# Patient Record
Sex: MALE | Race: WHITE | NOT HISPANIC OR LATINO | Employment: FULL TIME | URBAN - METROPOLITAN AREA
[De-identification: names, ages, dates, MRNs, and addresses within clinical notes are randomized per-mention and may not be internally consistent; named-entity substitution may affect disease eponyms.]

---

## 2019-05-13 ENCOUNTER — HOSPITAL ENCOUNTER (OUTPATIENT)
Dept: BEHAVIORAL HEALTH | Facility: CLINIC | Age: 24
Discharge: HOME | End: 2019-05-13
Attending: SOCIAL WORKER | Admitting: SOCIAL WORKER
Payer: COMMERCIAL

## 2019-05-13 VITALS — HEIGHT: 72 IN | WEIGHT: 205 LBS | BODY MASS INDEX: 27.77 KG/M2

## 2019-05-13 PROCEDURE — H0001 ALCOHOL AND/OR DRUG ASSESS: HCPCS

## 2019-05-13 ASSESSMENT — ANXIETY QUESTIONNAIRES
1. FEELING NERVOUS, ANXIOUS, OR ON EDGE: NOT AT ALL
5. BEING SO RESTLESS THAT IT IS HARD TO SIT STILL: NOT AT ALL
6. BECOMING EASILY ANNOYED OR IRRITABLE: NOT AT ALL
7. FEELING AFRAID AS IF SOMETHING AWFUL MIGHT HAPPEN: NOT AT ALL
IF YOU CHECKED OFF ANY PROBLEMS ON THIS QUESTIONNAIRE, HOW DIFFICULT HAVE THESE PROBLEMS MADE IT FOR YOU TO DO YOUR WORK, TAKE CARE OF THINGS AT HOME, OR GET ALONG WITH OTHER PEOPLE: NOT DIFFICULT AT ALL
GAD7 TOTAL SCORE: 0
3. WORRYING TOO MUCH ABOUT DIFFERENT THINGS: NOT AT ALL
2. NOT BEING ABLE TO STOP OR CONTROL WORRYING: NOT AT ALL

## 2019-05-13 ASSESSMENT — PATIENT HEALTH QUESTIONNAIRE - PHQ9
SUM OF ALL RESPONSES TO PHQ QUESTIONS 1-9: 0
5. POOR APPETITE OR OVEREATING: NOT AT ALL

## 2019-05-13 ASSESSMENT — PAIN SCALES - GENERAL: PAINLEVEL: NO PAIN (0)

## 2019-05-13 ASSESSMENT — MIFFLIN-ST. JEOR: SCORE: 1952.87

## 2019-05-13 NOTE — PROGRESS NOTES
[address]  [place] [address]        ADULT CD ASSESSMENT ADDENDUM      Patient Name: Gonzalez Delgado  Cell Phone:   Home: [phone]   Mobile:   No relevant phone numbers on file.       Email:  [email or web address]  Emergency Contact: Berenice Wharton   Tel: [phone]    The patient reported being:  Single, no serious involvement    With which race do you identify? White    Initial Screening Questions     1. Are you currently having severe withdrawal symptoms that are putting yourself or others in danger?  No    2. Are you currently having severe medical problems that require immediate attention?  No    3. Are you currently having severe emotional or behavioral problems that are putting yourself or others at risk of harm?  No    4. Do you have sufficient reading skills that will enable you to understand written materials, including the program rules and client rights materials?  Yes     Family History and other additional information     Who raised you? (parents, grandparents, adoptive parents, step-parents, etc.)    Mother  Grandparents  Step-father    Please tell me what it was like growing up in your family. (please include any history of substance abuse, mental health issues, emotional/physical/sexual abuse, forms of discipline, and support)     Per client: raised by parents and grandparents, four half sisters living and two half brothers living, mother living and step father living, real father . Good, stable childhood, no abuse within the family. Grew up in [place], [place]. Biological father was not in life, he passed away last year. Step-dad has been dad. Felt most supported by mom and step-dad. Talked to biological father three times throughout my life, active user, addiction with drugs. Two sisters that have the same biological dad, heard it from them as they had contact with him.      Do you have any children or Stepchildren? Yes,  "explain: Ron Delgado age 2 years old    Are you being investigated by Child Protection Services? No    Do you have a child protection worker, probation office or ?  Yes, explain: [facility]    How would you describe your current finances?  Just making it    If you are having problems, (unpaid bills, bankruptcy, IRS problems) please explain:  Yes, explain: \"tickets and fine\"    If working or a student are you able to function appropriately in that setting? Yes     Describe your preferred learning style:  by reading, by hands-on practice and by watching someone else demonstrate    What are your some of your personal strengths?  \"motivated to succeed 2 yr old boy\"    Do you currently participate in community britt activities, such as attending Pentecostal, temple, Restorationism or Restoration services?  No    How does your spirituality impact your recovery?  Per client: not spiritual person    Do you currently self-administer your medications?  The client is not currently taking any prescription or over the counter medications.    Have you ever had to lie to people important to you about how much you kiser?   No   Have you ever felt the need to bet more and more money?   No   Have you ever attempted treatment for a gambling problem?   No   Have you ever touched or fondled someone else inappropriately or forced them to have sex with you against their will?   No   Are you or have you ever been a registered sex offender?   No   Is there any history of sexual abuse in your family? No   Have you ever felt obsessed by your sexual behavior, such as having sex with many partners, masturbating often, using pornography often?   No     Have you ever received therapy or stayed in the hospital for mental health problems?   No     Have you ever hurt yourself, such as cutting, burning or hitting yourself?   No     Have you ever purged, binged or restricted yourself as a way to control your weight?   No     Are " "you on a special diet?   No     Do you have any concerns regarding your nutritional status?   No     Have you had any appetite changes in the last 3 months?   No   Have you had weight loss or weight gain of more than 10 lbs in the last 3 months?   If patient gained or lost more than 10 lbs, then refer to program RN / attending Physician for assessment.   No   Was the patient informed of BMI?    Above,  Referral to primary care physician   No   Have you engaged in any risk-taking behavior that would put you at risk for exposure to blood-borne or sexually transmitted diseases?   No   Do you have any dental problems?   No   Have you ever lived through any trauma or stressful life events?   Yes, explain: \"felony conviction/probation\"   In the past month, have you had any of the following symptoms related to the trauma listed above? (dreams, intense memories, flashbacks, physical reactions, etc.)   No   Have you ever believed people were spying on you, or that someone was plotting against you or trying to hurt you?   No   Have you ever believed someone was reading your mind or could hear your thoughts or that you could actually read someone's mind or hear what another person was thinking?   No   Have you ever believed that someone of some force outside of yourself was putting thoughts into your mind or made you act in a way that was not your usual self?  Have you ever though you were possessed?   No   Have you ever believed you were being sent special messages through the TV, radio or newspaper?   No   Have you ever heard things other people couldn't hear, such as voices or other noises?   No   Have you ever had visions when you were awake?  Or have you ever seen things other people couldn't see?   No   Do you have a valid 's license?    No, explain: client reported it is currently revoked.      PHQ-9, FRANK-7 and Suicide Risk Assessment   PHQ-9 on 5/13/2019 FRANK-7 on 5/13/2019   The patient's PHQ-9 score was 0 out of " 27, indicating no depression.   The patient's FRANK-7 score was 0 out of 21, indicating minimal anxiety.       Bern-Suicide Severity Rating Scale   Suicide Ideation   1.) Have you ever wished you were dead or that you could go to sleep and not wake up?     Lifetime:  No   Past Month:  No     2.) Have you actually had any thoughts of killing yourself?   Lifetime:  No   Past Month:  No     3.) Have you been thinking about how you might do this?     Lifetime:  No   Past Month:  No     4.) Have you had these thoughts and had some intention of acting on them?     Lifetime:  No   Past Month:  No     5.) Have you started to work out the details of how to kill yourself?   Lifetime:  No   Past Month:  No     6.) Do you intend to carry out this plan?      Lifetime:  No   Past Month:  No     Intensity of Ideation   Intensity of ideation (1 being least severe, 5 being most severe):     Lifetime:  The patient denied ever having any suicidal thoughts in life.   Past Month:  The patient denied ever having any suicidal thoughts in life.     How often do you have these thoughts?  The patient denied ever having any suicidal thoughts in life.     When you have the thoughts how long do they last?  The patient denied ever having any suicidal thoughts in life.     Can you stop thinking about killing yourself or wanting to die if you want to?  The patient denied ever having any suicidal thoughts in life.     Are there things - anyone or anything (i.e. family, Pentecostalism, pain of death) that stopped you from wanting to die or acting on thoughts of suicide?  Does not apply     What sort of reasons did you have for thinking about wanting to die or killing yourself (ie end pain, stop how you were feeling, get attention or reaction, revenge)?  Does not apply     Suicidal Behavior   (Suicide Attempt) - Have you made a suicide attempt?     Lifetime:  The patient had never made a suicide attempt.   Past Month:  The patient had never made a  suicide attempt.     Have you engaged in self-harm (non-suicidal self-injury)?  The patient denied having any history of engaging in self-harm (non-suicidal self-injury).     (Interrupted Attempt) - Has there been a time when you started to do something to end your life but someone or something stopped you before you actually did anything?  No     (Aborted or Self-Interrupted Attempt) - Has there been a time when you started to do something to try to end your life but you stopped yourself before you actually did anything?  No     (Preparatory Acts of Behavior) - Have you taken any steps towards making suicide attempt or preparing to kill yourself (such as collecting pills, getting a gun, giving valuables away or writing a suicide note)?  No     Actual Lethality/Medical Damage:  The patient denied ever making a suicidal attempt.       2008  [employer]  Used with permission by Yeimy Ledesma, PhD.       Guide to C-SSRS Risk Ratings   NO IDEATION:  with no active thoughts IDEATION: with a wish to die. IDEATION: with active thoughts. Risk Ratings   If Yes No No 0 - Very Low Risk   If NA Yes No 1 - Low Risk   If NA Yes Yes 2 - Low/moderate risk   IDEATION: associated thoughts of methods without intent or plan INTENT: Intent to follow through on suicide PLAN: Plan to follow through on suicide Risk Ratings cont...   If Yes No No 3 - Moderate Risk   If Yes Yes No 4 - High Risk   If Yes Yes Yes 5 - High Risk   The patient's ADDITIONAL RISK FACTORS and lack of PROTECTIVE FACTORS may increase their overall suicide risk ratings.     Additional Risk Factors:    History of impulsive or aggressive behaviors     Significant legal problems including being at risk of incarceration   Protective Factors:    Having people in his/her life that would prevent the patient from considering a suicide attempt (i.e. young children, spouse, parents, etc.)     Having restricted access to highly lethal means of  "suicide     Risk Status   Past month:0. - Very Low Risk:  Evaluation Counselors:  Document in Epic / SBAR to counselor \"Very Low Risk\".      Treatment Counselors:  Reassess upon admission as applicable, assess weekly in progress notes under Dimension 3 and summarize in Discharge / Treatment summary under Dim[facility] 3.    Past 24 hours:0. - Very Low Risk:  Evaluation Counselors:  Document in Epic / SBAR to counselor \"Very Low Risk\".      Treatment Counselors:  Reassess upon admission as applicable, assess weekly in progress notes under Dimension 3 and summarize in Discharge / Treatment summary under Dim[facility].   Additional information to support suicide risk rating: There was no additional information to provide at this time.     Mental Health Status   Physical Appearance/Attire: Appears stated age and Neat   Hygiene: well groomed   Eye Contact: at examiner   Speech Rate:  regular   Speech Volume: regular   Speech Quality: fluid   Cognitive/Perceptual:  reality based   Cognition: memory intact    Judgment: able to concentrate   Insight: able to concentrate   Orientation:  time, place, person and situation   Thought: concrete   Hallucinations:  none   General Behavioral Tone: cooperative   Psychomotor Activity: no problem noted   Gait:  no problem   Mood: appropriate   Affect: congruence/appropriate   Counselor Notes: NA     Criteria for Diagnosis: DSM-5 Criteria for Substance Use Disorders      Alcohol Use Disorder Severe - 303.90 (F10.20)  Cannabis Use Disorder Severe - 304.30 (F12.20) in early remission    Level of Care   I.) Intoxication and Withdrawal: 0   II.) Biomedical:  0   III.) Emotional and Behavioral:  2   IV.) Readiness to Change:  2   V.) Relapse Potential: 3   VI.) Recovery Environmental: 2     Initial Problem List     The patient is currently living in an unhealthy and/or using environment  The patient lacks relapse prevention skills  The patient has poor coping skills  The patient lacks a sober peer " support network  The patient lacks the ability to effectively manage his/her mental health issues  The patient has current legal issues    Patient/Client is willing to follow treatment recommendations.  Yes    Counselor: DARREL Rios      1.  Do you have a physical, emotional or mental infirmity or dysfunction?       No    2.  Does this issue impair your ability to provide for your own care without help, including providing yourself with food, shelter, clothing, healthcare or supervision?       No    3.  Because of this issue, I need assistance to protect myself from maltreatment by others.      No    Based on the above information:    This person is not a functional Vulnerable Adult according to [place] Statute 626.5572 subdivision 21.
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
## 2019-05-14 ASSESSMENT — ANXIETY QUESTIONNAIRES: GAD7 TOTAL SCORE: 0

## 2019-06-10 ENCOUNTER — HOSPITAL ENCOUNTER (EMERGENCY)
Facility: CLINIC | Age: 24
Discharge: HOME | End: 2019-06-10
Attending: EMERGENCY MEDICINE | Admitting: EMERGENCY MEDICINE

## 2019-06-10 VITALS
TEMPERATURE: 98.1 F | BODY MASS INDEX: 29.8 KG/M2 | OXYGEN SATURATION: 96 % | DIASTOLIC BLOOD PRESSURE: 93 MMHG | SYSTOLIC BLOOD PRESSURE: 151 MMHG | HEIGHT: 72 IN | HEART RATE: 86 BPM | RESPIRATION RATE: 18 BRPM | WEIGHT: 220 LBS

## 2019-06-10 DIAGNOSIS — K04.7 DENTAL ABSCESS: ICD-10-CM

## 2019-06-10 PROCEDURE — 64400 NJX AA&/STRD TRIGEMINAL NRV: CPT | Mod: Z6 | Performed by: EMERGENCY MEDICINE

## 2019-06-10 PROCEDURE — 99283 EMERGENCY DEPT VISIT LOW MDM: CPT | Mod: 25 | Performed by: EMERGENCY MEDICINE

## 2019-06-10 PROCEDURE — 64400 NJX AA&/STRD TRIGEMINAL NRV: CPT | Performed by: EMERGENCY MEDICINE

## 2019-06-10 PROCEDURE — 25000132 ZZH RX MED GY IP 250 OP 250 PS 637: Performed by: EMERGENCY MEDICINE

## 2019-06-10 PROCEDURE — 99284 EMERGENCY DEPT VISIT MOD MDM: CPT | Mod: 25 | Performed by: EMERGENCY MEDICINE

## 2019-06-10 RX ORDER — BUPIVACAINE HYDROCHLORIDE AND EPINEPHRINE 5; 5 MG/ML; UG/ML
INJECTION, SOLUTION PERINEURAL
Status: DISCONTINUED
Start: 2019-06-10 | End: 2019-06-10 | Stop reason: HOSPADM

## 2019-06-10 RX ORDER — PENICILLIN V POTASSIUM 250 MG/1
500 TABLET, FILM COATED ORAL ONCE
Status: COMPLETED | OUTPATIENT
Start: 2019-06-10 | End: 2019-06-10

## 2019-06-10 RX ORDER — PENICILLIN V POTASSIUM 500 MG/1
500 TABLET, FILM COATED ORAL 4 TIMES DAILY
Qty: 40 TABLET | Refills: 0 | Status: SHIPPED | OUTPATIENT
Start: 2019-06-10 | End: 2019-06-20

## 2019-06-10 RX ADMIN — IBUPROFEN 600 MG: 400 TABLET ORAL at 03:08

## 2019-06-10 RX ADMIN — PENICILLIN V POTASSIUM 500 MG: 250 TABLET, FILM COATED ORAL at 03:08

## 2019-06-10 ASSESSMENT — MIFFLIN-ST. JEOR: SCORE: 2020.91

## 2019-06-10 NOTE — ED NOTES
Here with nerve pain to left top molar-states crown broke off & has been unable to get into dentist. Pain significantly worse when lying down, taking tylenol without any relief.

## 2019-06-10 NOTE — ED PROVIDER NOTES
Chief Compaint:  CC:  Dental pain    HPI:   Gonzalez Delgado is a 24 year old male who presents to the ED with dental pain.  There is no facial swelling.   The pain is centered at the #16 tooth.  Onset of pain was 2 week(s) ago and is severe.  The dental pain has not been relieved with over the counter pain medications.  There is no trismus. Recent dental work: no.  Patient had resolution of symptoms 2 weeks ago, however it is recurred over the past 2 to 3 days.  Pain is severe.  No alleviating factors.  No fever, or chills.  Did have prior cavity they are.  Has not seen dentist in approximately 5 years.      Medications:  Current Outpatient Medications   Medication Sig Dispense Refill     penicillin V (VEETID) 500 MG tablet Take 1 tablet (500 mg) by mouth 4 times daily for 10 days 40 tablet 0        Allergies:   No Known Allergies     Medications updated and reviewed.  Past, family and surgical history is updated and reviewed in the record.    Review of Systems:  General: no fever  Ears: no ear pain    Physical Exam:   BP (!) 151/93   Pulse 86   Temp 98.1  F (36.7  C) (Oral)   Resp 18   Ht 1.829 m (6')   Wt 99.8 kg (220 lb)   SpO2 96%   BMI 29.84 kg/m     General: healthy, alert and mild distress  ENT: ENT exam normal, no neck nodes or sinus tenderness  Mouth: facial swelling is Absent   tenderness to touch at tooth: 16   Teeth carious:yes    Teeth broken: no   Visible abscess: no   Neck: negative    Assessment:  1. Dental abscess      No abscess amenable to incision and drainage.    Plan:   Home with oral analgesics, and initial dose was given in the ED.  A dental block was requested  Because pain was severe, periapical abscess was presumed and therefore, antibiotics were prescribed.  There was no visible abscess amenable to incision and drainage.  Follow up with dental clinic as soon as possible.   Dental clinic list given to patient.    Return ED with fevers, uncontrolled pain, inability to  eat/drink.    Condition on disposition: Stable    Procedure Note - Dental Block:  Universal Protocol:  Patient Identification: Verified  Time Out: Performed    Procedure performed by Sukumar Rosas       To achieve control of dental pain, a supraperiosteal nerve block was performed on the left side. 1.8 mL of marcaine was injected without complication.  The patient tolerated the procedure well.                     Sukumar Rosas MD  06/10/19 0308

## 2019-06-10 NOTE — ED AVS SNAPSHOT
[facility] Emergency Department  [address] [address]  Phone:  [phone]  Fax:  [phone]                                    Gonzalez Delgado   MRN: [id number]    Department:  [facility] Emergency Department   Date of Visit:  6/10/2019           After Visit Summary Signature Page    I have received my discharge instructions, and my questions have been answered. I have discussed any challenges I see with this plan with the nurse or doctor.    ..........................................................................................................................................  Patient/Patient Representative Signature      ..........................................................................................................................................  Patient Representative Print Name and Relationship to Patient    ..................................................               ................................................  Date                                   Time    ..........................................................................................................................................  Reviewed by Signature/Title    ...................................................              ..............................................  Date                                               Time          22EPIC Rev 08/18

## 2019-06-10 NOTE — DISCHARGE INSTRUCTIONS
Continue tylenol and ibuprofen.    Alternate these medications every three hours as needed for pain.  (For example, 1000mg tylenol at 8am, 600mg ibuprofen at 11am, tylenol at 2pm, ibuprofen at 5pm, tylenol at 8pm...)    Antibiotics as prescribed.     Many of these clinics offer a sliding fee option for patients that qualify, and see patients on a walk-in or same day basis. Please call each clinic directly. As services, hours, fees and policies vary greatly.          [facility], [facility]  [phone]  Sees no insurance  [facility], [place]  [phone]  Preventive services only  [facility] (mult loc) [phone]  [facility]    ([facility]), [facility]  [phone]  [facility]       [phone]  Preventive services only  [facility]  [phone]  Accepts MA & sees no ins  [facility],      Accepts MA & sees no ins   [place]   [phone]; [phone]  [facility]   Accepts MA & sees no ins       [phone]  [facility], [facility]  [phone]   Accepts MA emergencies  [facility] [phone]  [facility],     Accepts [facility]   [phone]    [facility] [phone]  Accepts MA & sees no ins   [facility]   Dental Clinic    [phone]  [facility], [facility]  [phone]   [facility] [phone]  [facility]  Preventive services only   [place]   [phone]  [facility] [phone]  [facility], [place]  [phone]  Same day [facility] [phone]  Same day [facility],      Same day [facility]   [phone]    [facility], [facility] [phone]  Free clinic, walk-in only  [facility] (multiple  locations) [phone]      [facility] , [facility] [phone]    [facility] [phone]  Free clinic, walk-in only  [facility]  [phone]  [facility] [phone] (adults)       [phone] (children)  [facility] [phone]    Also, referral service for low cost dental and healthcare: [phone]  And [phone]

## 2019-08-08 ENCOUNTER — HOSPITAL ENCOUNTER (OUTPATIENT)
Dept: BEHAVIORAL HEALTH | Facility: CLINIC | Age: 24
End: 2019-08-08
Attending: SOCIAL WORKER
Payer: COMMERCIAL

## 2019-08-08 VITALS — BODY MASS INDEX: 30.8 KG/M2 | WEIGHT: 220 LBS | HEIGHT: 71 IN

## 2019-08-08 PROCEDURE — 40000007 ZZH STATISTIC ADULT CD FACE TO FACE-NO CHRG

## 2019-08-08 ASSESSMENT — PATIENT HEALTH QUESTIONNAIRE - PHQ9
5. POOR APPETITE OR OVEREATING: NOT AT ALL
SUM OF ALL RESPONSES TO PHQ QUESTIONS 1-9: 4

## 2019-08-08 ASSESSMENT — ANXIETY QUESTIONNAIRES
2. NOT BEING ABLE TO STOP OR CONTROL WORRYING: NOT AT ALL
5. BEING SO RESTLESS THAT IT IS HARD TO SIT STILL: NOT AT ALL
IF YOU CHECKED OFF ANY PROBLEMS ON THIS QUESTIONNAIRE, HOW DIFFICULT HAVE THESE PROBLEMS MADE IT FOR YOU TO DO YOUR WORK, TAKE CARE OF THINGS AT HOME, OR GET ALONG WITH OTHER PEOPLE: NOT DIFFICULT AT ALL
1. FEELING NERVOUS, ANXIOUS, OR ON EDGE: NOT AT ALL
3. WORRYING TOO MUCH ABOUT DIFFERENT THINGS: SEVERAL DAYS
7. FEELING AFRAID AS IF SOMETHING AWFUL MIGHT HAPPEN: NOT AT ALL
GAD7 TOTAL SCORE: 1
6. BECOMING EASILY ANNOYED OR IRRITABLE: NOT AT ALL

## 2019-08-08 ASSESSMENT — MIFFLIN-ST. JEOR: SCORE: 2005.04

## 2019-08-08 ASSESSMENT — PAIN SCALES - GENERAL: PAINLEVEL: NO PAIN (0)

## 2019-08-08 NOTE — PROGRESS NOTES
[address]  [place] [address]        ADULT CD ASSESSMENT ADDENDUM      Patient Name: Gonzalez Delgado  Cell Phone:   Home: [phone]   Mobile:   No relevant phone numbers on file.       Email:  [email or web address]  Emergency Contact: Berenice Wharton   Tel: [phone]    The patient reported being:  Single, no serious involvement    With which race do you identify? White    Initial Screening Questions     1. Are you currently having severe withdrawal symptoms that are putting yourself or others in danger?  No    2. Are you currently having severe medical problems that require immediate attention?  No    3. Are you currently having severe emotional or behavioral problems that are putting yourself or others at risk of harm?  No    4. Do you have sufficient reading skills that will enable you to understand written materials, including the program rules and client rights materials?  Yes     Family History and other additional information     Who raised you? (parents, grandparents, adoptive parents, step-parents, etc.)    Mother  Grandparents  Step-father    Please tell me what it was like growing up in your family. (please include any history of substance abuse, mental health issues, emotional/physical/sexual abuse, forms of discipline, and support)     Per client: raised by parents and grandparents, four half sisters living and two half brothers living, mother living and step father living, real father . Good, stable childhood, no abuse within the family. Grew up in [place], [place]. Biological father was not in life, he passed away last year. Step-dad has been dad. Felt most supported by mom and step-dad. Talked to biological father three times throughout my life, active user, addiction with drugs. Two sisters that have the same biological dad, heard it from them as they had contact with him.      Do you have any children or Stepchildren? Yes,  "explain: Ron eDlgado age 2 years old    Are you being investigated by Child Protection Services? No    Do you have a child protection worker, probation office or ?  Yes, explain: [facility]    How would you describe your current finances?  Doing Okay    If you are having problems, (unpaid bills, bankruptcy, IRS problems) please explain:  No    If working or a student are you able to function appropriately in that setting? Yes     Describe your preferred learning style:  by reading, by hands-on practice and by watching someone else demonstrate    What are your some of your personal strengths?  \"motivated by success. Fatherhood, open minded, willing\"    Do you currently participate in community britt activities, such as attending Jehovah's witness, temple, Sikh or Worship services?  No    How does your spirituality impact your recovery?  Per client: not spiritual person    Do you currently self-administer your medications?  The client is not currently taking any prescription or over the counter medications.    Have you ever had to lie to people important to you about how much you kiser?   No   Have you ever felt the need to bet more and more money?   No   Have you ever attempted treatment for a gambling problem?   No   Have you ever touched or fondled someone else inappropriately or forced them to have sex with you against their will?   No   Are you or have you ever been a registered sex offender?   No   Is there any history of sexual abuse in your family? No   Have you ever felt obsessed by your sexual behavior, such as having sex with many partners, masturbating often, using pornography often?   No     Have you ever received therapy or stayed in the hospital for mental health problems?   No     Have you ever hurt yourself, such as cutting, burning or hitting yourself?   No     Have you ever purged, binged or restricted yourself as a way to control your weight?   No     Are you on a " "special diet?   No     Do you have any concerns regarding your nutritional status?   No     Have you had any appetite changes in the last 3 months?   No   Have you had weight loss or weight gain of more than 10 lbs in the last 3 months?   If patient gained or lost more than 10 lbs, then refer to program RN / attending Physician for assessment.   No   Was the patient informed of BMI?    Above,  Referral to primary care physician   No   Have you engaged in any risk-taking behavior that would put you at risk for exposure to blood-borne or sexually transmitted diseases?   No   Do you have any dental problems?   No   Have you ever lived through any trauma or stressful life events?   Yes, explain: \"probation/law\"   In the past month, have you had any of the following symptoms related to the trauma listed above? (dreams, intense memories, flashbacks, physical reactions, etc.)   No   Have you ever believed people were spying on you, or that someone was plotting against you or trying to hurt you?   No   Have you ever believed someone was reading your mind or could hear your thoughts or that you could actually read someone's mind or hear what another person was thinking?   No   Have you ever believed that someone of some force outside of yourself was putting thoughts into your mind or made you act in a way that was not your usual self?  Have you ever though you were possessed?   No   Have you ever believed you were being sent special messages through the TV, radio or newspaper?   No   Have you ever heard things other people couldn't hear, such as voices or other noises?   No   Have you ever had visions when you were awake?  Or have you ever seen things other people couldn't see?   No   Do you have a valid 's license?    No, explain: client reported it is currently revoked.      PHQ-9, FRANK-7 and Suicide Risk Assessment   PHQ-9 on 8/8/2019 FRANK-7 on 8/8/2019   The patient's PHQ-9 score was 2 out of 27, indicating no " depression.   The patient's FRANK-7 score was 1 out of 21, indicating minimal anxiety.       [place]-Suicide Severity Rating Scale   Suicide Ideation   1.) Have you ever wished you were dead or that you could go to sleep and not wake up?     Lifetime:  No   Past Month:  No     2.) Have you actually had any thoughts of killing yourself?   Lifetime:  No   Past Month:  No     3.) Have you been thinking about how you might do this?     Lifetime:  No   Past Month:  No     4.) Have you had these thoughts and had some intention of acting on them?     Lifetime:  No   Past Month:  No     5.) Have you started to work out the details of how to kill yourself?   Lifetime:  No   Past Month:  No     6.) Do you intend to carry out this plan?      Lifetime:  No   Past Month:  No     Intensity of Ideation   Intensity of ideation (1 being least severe, 5 being most severe):     Lifetime:  The patient denied ever having any suicidal thoughts in life.   Past Month:  The patient denied ever having any suicidal thoughts in life.     How often do you have these thoughts?  The patient denied ever having any suicidal thoughts in life.     When you have the thoughts how long do they last?  The patient denied ever having any suicidal thoughts in life.     Can you stop thinking about killing yourself or wanting to die if you want to?  The patient denied ever having any suicidal thoughts in life.     Are there things - anyone or anything (i.e. family, [employer], pain of death) that stopped you from wanting to die or acting on thoughts of suicide?  Does not apply     What sort of reasons did you have for thinking about wanting to die or killing yourself (ie end pain, stop how you were feeling, get attention or reaction, revenge)?  Does not apply     Suicidal Behavior   (Suicide Attempt) - Have you made a suicide attempt?     Lifetime:  The patient had never made a suicide attempt.   Past Month:  The patient had never made a suicide attempt.      Have you engaged in self-harm (non-suicidal self-injury)?  The patient denied having any history of engaging in self-harm (non-suicidal self-injury).     (Interrupted Attempt) - Has there been a time when you started to do something to end your life but someone or something stopped you before you actually did anything?  No     (Aborted or Self-Interrupted Attempt) - Has there been a time when you started to do something to try to end your life but you stopped yourself before you actually did anything?  No     (Preparatory Acts of Behavior) - Have you taken any steps towards making suicide attempt or preparing to kill yourself (such as collecting pills, getting a gun, giving valuables away or writing a suicide note)?  No     Actual Lethality/Medical Damage:  The patient denied ever making a suicidal attempt.       2008  [employer]  Used with permission by Yeimy Ledesma, PhD.       Guide to C-SSRS Risk Ratings   NO IDEATION:  with no active thoughts IDEATION: with a wish to die. IDEATION: with active thoughts. Risk Ratings   If Yes No No 0 - Very Low Risk   If NA Yes No 1 - Low Risk   If NA Yes Yes 2 - Low/moderate risk   IDEATION: associated thoughts of methods without intent or plan INTENT: Intent to follow through on suicide PLAN: Plan to follow through on suicide Risk Ratings cont...   If Yes No No 3 - Moderate Risk   If Yes Yes No 4 - High Risk   If Yes Yes Yes 5 - High Risk   The patient's ADDITIONAL RISK FACTORS and lack of PROTECTIVE FACTORS may increase their overall suicide risk ratings.     Additional Risk Factors:    History of impulsive or aggressive behaviors     Significant legal problems including being at risk of incarceration   Protective Factors:    Having people in his/her life that would prevent the patient from considering a suicide attempt (i.e. young children, spouse, parents, etc.)     Having restricted access to highly lethal means of suicide     Risk  "Status   Past month:0. - Very Low Risk:  Evaluation Counselors:  Document in Epic / SBAR to counselor \"Very Low Risk\".      Treatment Counselors:  Reassess upon admission as applicable, assess weekly in progress notes under Dim[facility] and summarize in Discharge / Treatment summary under [facility].    Past 24 hours:0. - Very Low Risk:  Evaluation Counselors:  Document in Epic / SBAR to counselor \"Very Low Risk\".      Treatment Counselors:  Reassess upon admission as applicable, assess weekly in progress notes under [facility] and summarize in Discharge / Treatment summary under [facility].   Additional information to support suicide risk rating: There was no additional information to provide at this time.     Mental Health Status   Physical Appearance/Attire: Appears stated age and Neat   Hygiene: well groomed   Eye Contact: at examiner   Speech Rate:  regular   Speech Volume: regular   Speech Quality: fluid   Cognitive/Perceptual:  reality based   Cognition: memory intact    Judgment: able to concentrate   Insight: able to concentrate   Orientation:  time, place, person and situation   Thought: concrete   Hallucinations:  none   General Behavioral Tone: cooperative   Psychomotor Activity: no problem noted   Gait:  no problem   Mood: appropriate   Affect: congruence/appropriate   Counselor Notes: NA     Criteria for Diagnosis: DSM-5 Criteria for Substance Use Disorders      Alcohol Use Disorder Severe - 303.90 (F10.20)  Cannabis Use Disorder Severe - 304.30 (F12.20) in early remission    Level of Care   I.) Intoxication and Withdrawal: 0   II.) Biomedical:  0   III.) Emotional and Behavioral:  2   IV.) Readiness to Change:  2   V.) Relapse Potential: 3   VI.) Recovery Environmental: 2     Initial Problem List     The patient is currently living in an unhealthy and/or using environment  The patient lacks relapse prevention skills  The patient has poor coping skills  The patient lacks a sober peer support " network  The patient lacks the ability to effectively manage his/her mental health issues  The patient has current legal issues    Patient/Client is willing to follow treatment recommendations.  Yes    Counselor: DARREL Rios      1.  Do you have a physical, emotional or mental infirmity or dysfunction?       No    2.  Does this issue impair your ability to provide for your own care without help, including providing yourself with food, shelter, clothing, healthcare or supervision?       No    3.  Because of this issue, I need assistance to protect myself from maltreatment by others.      No    Based on the above information:    This person is not a functional Vulnerable Adult according to [place] Statute 626.5572 subdivision 21.
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
## 2019-08-09 ASSESSMENT — ANXIETY QUESTIONNAIRES: GAD7 TOTAL SCORE: 1

## 2019-08-21 ENCOUNTER — HOSPITAL ENCOUNTER (OUTPATIENT)
Dept: BEHAVIORAL HEALTH | Facility: CLINIC | Age: 24
End: 2019-08-21
Attending: SOCIAL WORKER
Payer: COMMERCIAL

## 2019-08-21 ENCOUNTER — TRANSFERRED RECORDS (OUTPATIENT)
Dept: HEALTH INFORMATION MANAGEMENT | Facility: CLINIC | Age: 24
End: 2019-08-21

## 2019-08-21 ENCOUNTER — BEH TREATMENT PLAN (OUTPATIENT)
Dept: BEHAVIORAL HEALTH | Facility: CLINIC | Age: 24
End: 2019-08-21
Attending: FAMILY MEDICINE

## 2019-08-21 DIAGNOSIS — F19.20 CHEMICAL DEPENDENCY (H): ICD-10-CM

## 2019-08-21 PROCEDURE — H2035 A/D TX PROGRAM, PER HOUR: HCPCS

## 2019-08-21 NOTE — PROGRESS NOTES
[facility]  Adult Chemical Dependency Program  Treatment Plan Requirements    These services are provided by the facility for each patient/client according to the individual's treatment plan:    Individual and group counseling    Education    Transition services    Services to address any co-occurring mental illness    Service coordination    Initial Treatment Plan Goals:  1. Complete all the requirements of Program Orientation.  2. Maintain medication compliance throughout the program.  3. Complete requirements for workshop/skills groups based on identified issues on your problem list.  4. Complete the support group attendance feedback sheet weekly.  5. Gain family involvement in treatment process to address family issues from the problem list.  6. Attend and participate in all required groups per individual treatment plan.  7. Focus attention to individualized issues from the treatment plan.  8. Complete all requirements for UA's, alcohol screening tests and other testing.  9. Schedule a physical examination if recommended.    In addition to the above, complete all individual goals as specifically outlines on your treatment plan.    Criteria for discharge:  Patients/clients are discharged from the program following completion of the entire program including Phase I and II or acceptance of other post-treatment referrals such as senior care house, or aftercare at other facilities.  Patients/clients may also be discharged for inappropriate behavior or chemical use.      Favorable Discharge - Patients/clients have completed agreed upon treatment goals, understand their diagnosis and appear motivated about the follow-up care.    Guarded Discharge - Patients/clients have demonstrated some understanding of their diagnosis and recovery process, and have completed some of their treatment goals.  This prognosis also includes patients/clients who have completed some treatment goals but have not made  commitment to community support or follow through with referrals.    Unfavorable Discharge - Patients/clients have not completed agreed upon treatment goals due to their own choice, have limited understanding of their diagnosis, and have shown minimal or inconsistent behavior conducive to recovery.  Those patients/clients discharged due to behavioral problems will also be unfavorable discharges.  Adult CD Treatment Plan                                Gonzalez Delgado   [id number]   1995     24 year old           male    Acute Intoxication/Withdrawal Potential     DIMENSION 1  RISK FACTOR: 0     SUBSTANCE USE DISORDERS:  Alcohol, Cannabis and tobacco            Date Assigned Source Area of Treatment Focus / Goal / Treatment Strategies    Target  Date Initials Outcome Date Completed   8/21/2019 Self -  Current  Area of Treatment Focus:  Client's last use date was reported as 5/10/2019.    Goal:  Develop effective strategies to maintain abstinence.    Treatment Strategies:  (Note: Must indicate the amount and frequency for each strategy)    Client will report to staff and group any alcohol or drug use daily when applicable though out treatment.  (daily throughout treatment)    Client will submit to random UA and breathalyzers when requested throughout treatment. (as requested throughout treatment)                         12/23/2019 12/23/2019                         LUCIO VALDES       Biomedical Conditions and Complaints     DIMENSION 2  RISK FACTOR: 0             Date Assigned Source Area of Treatment Focus / Goal / Treatment Strategies Target  Date Initials Outcome Date Completed   8/21/2019 Self -  Current  Area of Treatment Focus:  Client denied any medical issues that need direct care or intervention.    Goal:  Maintain good physical health.    Treatment Strategies:   (Note: Must indicate the amount and frequency for each strategy)    Client will obtain a yearly physical if they have not already. During  this appointment client will inform their doctor they are in G[facility]AL treatment and get honest about the extend of their chemical use.     Client will ensure they are getting enough sleep, exercise and eating a nutritious balanced diet.                        10/2/2019            12/23/2019                       LUCIO            KA                       Not completed                    Emotional/Behavioral/Cognitive Conditions and Complications     DIMENSION 3  RISK FACTOR: 1             Date Assigned Source Area of Treatment Focus / Goal / Treatment Strategies Target  Date Initials Outcome Date Completed   8/21/2019 Self -  Current  Area of Treatment Focus:   Client lacks understanding of how their mental health and chemical health impact each other.     Goal:  Client will verbalize the many aspects of how their mental health and chemical health impact each other.    Treatment Strategies:  (Note: Must indicate the amount and frequency for each strategy)    Client will participate in a Therapist run skills group weekly while in phase 1 and phase 2.    Client will complete an updated DA (1x) if one has not been completed recently.    Client will participate in individual therapy as recommended by their therapist throughout treatment.     Client will participate in a mental health skills group to learn about how addiction and mental health impact each other. Client will also learn about the signs, symptoms and treatments for both.    Client will learn (1x) about how their relationships have been impacted by their mental health and chemical health. Client will learn assertive communication skills, understand healthy boundaries, and be able to identify codependent behaviors with in relationships.     Client will learn (1x) about the family disease of addiction and how mental health complicates roles further. Client will identify which family role each of their family members play in their family.     Client will learn (1x) a  "general overview of DBT skills and how they can help decrease symptomology of mental health and chemical health disorders. Client will discuss how these skills can help create balance, peace and structure.     Client will learn (1x) about the importance of overall wellness in decreasing symptomology of mental health and chemical health symptoms. Client will demonstrate one new wellness skill this week and report back in their next group.     Client will learn (1x) about the stages of grief and loss and how grief and loss have a significant impact on one's mental health and chemical use.                            11/12/2019 8/27/2019 12/23/2019 9/30/2019 8/26/2019 9/2/2019 9/9/2019 9/16/2019 9/23/2019                           KA      LUCIO VALDES                             Completed      Completed      Completed        Completed            Completed                Completed            Completed              Completed              Completed                           1/27/2020      8/27/2019      1/27/2020        8/29/2019            9/3/2019                9/11/2019            10/2/2019              10/9/2019              10/14/2019     Date Assigned Source Area of Treatment Focus / Goal / Treatment Strategies Target  Date Initials Outcome Date Completed     9/16/2019 Self -  Current  Area of Treatment Focus:   Client meets criteria for substance induced anxiety.    Goal:  Client will verbalize a decrease in anxiety symptoms now that he is sober.    Treatment Strategies:  (Note: Frequency of all strategies are once unless specifically noted.)    Client will practice the \"Thought Stopping\" exercise and report back it's effectiveness in therapy.    Client will complete the \"Tools for Anxiety\" worksheet and report back to therapy.  "                         9/30/2019        10/21/2019                         LUCIO VALDES                         Completed        Completed                         9/30/2019        10/28/2019     Date Assigned Source Area of Treatment Focus / Goal / Treatment Strategies Target  Date Initials Outcome Date Completed     11/12/2019 Self -  Current  Area of Treatment Focus:   Regulation of intense emotions    Goal:  Gonzalez will continuing building and implementing healthy coping strategies to manage emotions    Treatment Strategies:  (Note: Frequency of all strategies are once unless specifically noted.)    Client will practice assertiveness at least 2 time per week and report experiences to group every Tuesday 1/28/2020                                 LYNN                                                                         Readiness to Change     DIMENSION 4  RISK FACTOR: 3             Date Assigned Source Area of Treatment Focus / Goal / Treatment Strategies Target  Date Initials Outcome Date Completed   8/21/2019 Self -  Current  Area of Treatment Focus:  Client is in need of the support of an outpatient treatment program.    Goal:  Client will verbalize the benefits of this outpatient treatment program.    Treatment Strategies:  (Note: Must indicate the amount and frequency for each strategy)    Client will attend three 3 hour sessions of group a week and individual sessions as needed throughout the course of Phase I outpatient treatment.    Client will attend two 3 hour sessions of group a week and individual sessions as needed throughout the course of Phase II outpatient treatment.    Client will attend one 2 hour session of group a week and individual sessions as needed throughout the course of Phase III outpatient treatment.                         10/2/2019          12/17/2020          1/28/2020                         LUCIO BAILEY                         Completed  "                        10/30/2019     Date Assigned Source Area of Treatment Focus / Goal / Treatment Strategies Target  Date Initials Outcome Date Completed   8/21/2019 Self -  Current  Area of Treatment Focus:  Client states they would like to work on learning more about what skills are needed to maintain sobriety.    Goal:  Client will demonstrate healthy recovery skills daily.     Treatment Strategies:  (Note: Must indicate the amount and frequency for each strategy)    Client will complete (1x)  the \"consequences worksheet\" and share in group how my addiction has impacted my life.    Client will complete (1x) worksheet on \"looking outside of yourself for the solution\" and share in group my need for a strong support system in recovery.    Client will complete (1x)  the worksheet on \"what it means to make a decision\" and share in group what changes I need to make to support ongoing recovery.                           9/23/2019        10/28/2019          11/25/2019                           LUCIO VALDES                           Completed        Completed                           9/30/2019        10/30/2019     Date Assigned Source Area of Treatment Focus / Goal / Treatment Strategies Target  Date Initials Outcome Date Completed   8/21/2019 Self -  Current  Area of Treatment Focus:  Client reports this is his first treatment and he lacks education about substance abuse.    Goal:  Client will verbalize an understanding of substance abuse disorders.     Treatment Strategies:  (Note: Must indicate the amount and frequency for each strategy)    *Client will read, \"CD: An acceptable disease\" and share insights in group.    Client will make a list of 20 consequences of his use and share in group.     Client will complete the \"Blocks and Assets\" worksheet and ask group for feedback on what they see in terms of his blocks and assets in recovery.                          9/4/2019 9/18/2019 9/25/2019 " "                        LUCIO VALDES      KA                         Completed      Completed      Completed                         9/4/19      9/18/19      10/2/19     Insert here:   Date Assigned Source Area of Treatment Focus / Goal / Treatment Strategies Target  Date Initials Outcome Date Completed      11/11/2019 Self -  Current  Area of Treatment Focus:   Gonzalez reports wanting to increase his self confidence     Goal:  Gonzalez will identify personal problems areas and strategies to improve confidence in those areas     Treatment Strategies:  (Note: Frequency of all strategies are once unless specifically noted.)     Client will complete \"In a world of Unlimited Confidence\" questions and share with group         Client will complete \"As I develop genuine self confidence\" questions and share with group      Client will complete \"Personal [place] Statement\" assignment and share with group                                                         11/26/19 12/17/19 12/31/19                                       CN           CN        CN                                                           Relapse/Continues Use/Continues Problem Potential     DIMENSION 5  RISK FACTOR: 3                 Date Assigned Source Area of Treatment Focus / Goal / Treatment Strategies Target  Date Initials Outcome Date Completed   8/21/2019 Self -  Current  Area of Treatment Focus:  Client would like to expand his coping strategies to minimize the risk of relapse.     Goal:  Client will demonstrate three new coping strategies Changed To:   Client will develop recovery care plan while in Phase 3     Treatment Strategies:  (Note: Must indicate the amount and frequency for each strategy)    Client will complete (1x)  the Recovery Care Plan while in Phase 3 and share with the group.     Client will use the worksheets provided during the DBT mindfulness group and practice one new skill each day for 2 weeks and report back to " group which skills were effective.                          TBD - phase 3      10/21/2019                         LUCIO        LUCIO                                 Completed                                 10/14/2019     Recovery Environment     DIMENSION 6  RISK FACTOR: 2                 Date Assigned Source Area of Treatment Focus / Goal / Treatment Strategies Target  Date Initials Outcome Date Completed   8/21/2019 Self -  Current  Area of Treatment Focus:  Client reports he isolates when he is at risk for using.     Goal:  Client will expand his sober support network by three people. Changed To:   Client will continue to build sober network an engage in sober activities with other sober individuals   Treatment Strategies:  (Note: Must indicate the amount and frequency for each strategy)    Client will attend one support meeting this week and report back my experience to the group. (1x)    Client will attend a support meeting this week and obtain the names and numbers of three sober people. (1x)    Client will utilize one of the names and numbers he obtained at last weeks meeting and schedule a sober outing with the person. (1x)                                 9/18/2019        10/9/2019        10/16/2019                               LUCIO VALDES                               Completed        Completed                               10/11/2019        11/12/19     Date Assigned Source Area of Treatment Focus / Goal / Treatment Strategies Target  Date Initials Outcome Date Completed   8/21/2019 Self -  Current  Area of Treatment Focus:  Client has legal consequences due to chemical use.    Goal:  Client will resolve all legal issues. Changed To: Client will remain compliant with probation    Treatment Strategies:  (Note: Must indicate the amount and frequency for each strategy)    Client will follow all legal recommendations and requirements. (Ongoing throughout treatment)                         1/23/2020  "                        [facility]                                     Individual abuse prevention plan (required for lodging plus) : specific actions, referral:   No additional protection measures required other than the Program Abuse Prevention Plan - No    All interventions that are designated as \"current\" will need to be completed in order to transition out of treatment with a favorable prognosis. The treatment plan is a flexible document and a work in progress. Interventions and goals may be added at any time to customize the treatment plan to each individuals needs. Client may work with therapist/counselor to change interventions as long as they pertain to the goals stipulated in the plan and/or are clinically driven.      1. I have participated in creating my treatment plan with my therapist on 9/21/2019.     I agree with the plan as it is written in the electronic health record.      2. I have completed and reviewed my Safety Plan with my counselor and signed this on 9/21/2019. I have been given the hard copy of this plan.      3. Last Use Date: 5/10/2019      Gonzalez Garcia    ________________________ 8/21/2019     Client Name    Signature    Date      Lizzy Tenorio MA, [facility] ________________________ 8/21/2019     Name of Therapist   Signature    Date      "

## 2019-08-21 NOTE — PROGRESS NOTES
Patient:  Gonzalez Delgado    Date: August 21, 2019    Comprehensive Assessment UPDATE        Comprehensive Summary Update and Review  Counselor met with patient on 8/21/2019 and reviewed the Comprehensive Assessment.    There were no changes/updates identified by patient or in chart entries.    Christian-Suicide Severity Rating Scale Reassessment   Have you ever wished you were dead or that you could go to sleep and not wake up?  Past Month:  No   Have you actually had any thoughts of killing yourself?  Past Month:  No   Have you been thinking about how you might do this?     Past Month:  NA Lifetime:  NA   Have you had these thoughts and had some intention of acting on them?     Past Month:  NA Lifetime:  NA   Have you started to work out the details of how to kill yourself?   Past Month:  NA Lifetime:  NA   Do you intend to carry out this plan?   NA   When you have the thoughts how long do they last?  N/A   Are there things - anyone or anything (i.e. family, Jewish, pain of death) that stopped you from wanting to die or acting on thoughts of suicide?  Does not apply     2008  [employer] [facility]  Used with permission by Yeimy Ledesma, PhD.       Guide to C-SSRS Risk Ratings   NO IDEATION:  with no active thoughts IDEATION: with a wish to die. IDEATION: with active thoughts. Risk Ratings   If Yes No No 0 - Very Low Risk   If NA Yes No 1 - Low Risk   If NA Yes Yes 2 - Low/moderate risk   IDEATION: associated thoughts of methods without intent or plan INTENT: Intent to follow through on suicide PLAN: Plan to follow through on suicide Risk Ratings cont...   If Yes No No 3 - Moderate Risk   If Yes Yes No 4 - High Risk   If Yes Yes Yes 5 - High Risk   The patient's ADDITIONAL RISK FACTORS and lack of PROTECTIVE FACTORS may increase their overall suicide risk ratings.     Additional Risk Factors:    No additional risk factors   Protective Factors:    Having people in his/her life that would  "prevent the patient from considering a suicide attempt (i.e. young children, spouse, parents, etc.)     Having easy access to supportive family members     Risk Status   0. - Very Low Risk:  Evaluation Counselors:  Document in Epic / SBAR to counselor \"Very Low Risk\".      Treatment Counselors:  Reassess upon admission as applicable, assess weekly in progress notes under Dimension 3 and summarize in Discharge / Treatment summary under Dimension 3.   Additional information to support suicide risk rating: There was no additional information to provide at this time.     "

## 2019-08-21 NOTE — PROGRESS NOTES
Comprehensive Assessment Summary     Based on client interview, review of previous assessments and   comprehensive assessment interview the following diagnosis and recommendations are:     Patient: Gonzalez Delgado  MRN; [id number]  : 1995  Age: 24 year old Sex: male      Client meets criteria for:  303.90 Alcohol Dependence  304.30 Cannabis Dependence  305.10 Nicotine Dependence    Dimension One: Acute Intoxication/Withdrawal Potential     Ratin  (Consider the client's ability to cope with withdrawal symptoms and current state of intoxication)       Client denies any intoxication or withdrawal present. Client reports their last use date was 5/10/2019.    Dimension Two: Biomedical Condition and Complications    Ratin  (Consider the degree to which any physical disorder would interfere with treatment for substance abuse, and the client's ability to tolerate any related discomfort; determine the impact of continued chemical use on the unborn child if the client is pregnant)       Client denies any medical issues that need direct care or intervention at this time. Client states that while he does not have a PCP he has access to medical services if he should need them.     Dimension Three: Emotional/Behavioral/Cognitive Conditions & Complications  Ratin   (Determine the degree to which any condition or complications are likely to interfere with treatment for substance abuse or with functioning in significant life areas and the likelihood of risk of harm to self or others)       Client presents with a history of ADHD and some anxiety symptoms. We have scheduled an updated DA for 2019. Client denies any current therapist or psychiatrist. He denies any current medications. Client scored a 2 on the PHQ-9 and indicated not difficult at all. Client scored 3 on the FRANK-7 and indicated not difficult at all.  Client's protective factors include; Mom, brother, job, and son. Client's potential risk factors  "include; not keeping in contact with others, stress and legals. Client denies any suicidal thought, plan or intent. Client also denies any thoughts or behaviors of self harm.     Dimension Four: Treatment Acceptance/Resistance     Rating:  3  (Consider the amount of support and encouragement necessary to keep the client involved in treatment)       Client reports a history of no treatments. Client rates their motivation at a 10 on a scale of 1 - 10 with 10 being the highest. Client reports their main motivation for recovery is \"sick of all the drama, want to be successful, get off of probation\". Client had active, direct involvement in selecting the anticipated outcomes of the treatment process and developing the treatment plan. Client identified they would like to work on; anxiety and processing life. It also appears as though client would benefit from addressing; basic education on chemical dependency and mental health.     Dimension Five: Continued Use/Relapse Prevention     Rating:  3  (Consider the degree to which the client's recognizes relapse issues and has the skills to prevent relapse of either substance use or mental health problems)       Client reports their cravings at a 4 on a scale of 1 - 10 with 10 being the highest.He states he is able to think his way through them to minimize them and not act on them. He is able to identify that \"staying busy, skateboarding, going for a walk, playing the tape through\" help minimize their cravings. Client reports their biggest trigger is isolation. Client has some insight in to relapse prevention and appears to have minimal impulse control.     Dimension Six: Recovery Environment     Ratin  (Consider the degree to which key areas of the client's life are supportive of or antagonistic to treatment participation and recovery)       Client reports attending no meetings and denies. Client does not have a sponsor. 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 Client reports the following legal issues; DUI, felony 3rd degree assault. He is currently on probation through [facility]. Client is currently living in an environment conducive to recovery. Client is living alone in an apartment. He sees his son whenever he can. While CPS is involved due to his child's mother he has not obligations from CPS.      I have reviewed the information on the assessment, psychosocial and medical history and checklist:        the following changes have been made  Dimension 3 was lowered to a 1 as client denied many symptoms that impact his current living or any major concerns.

## 2019-08-21 NOTE — PROGRESS NOTES
Patient Safety Plan Template    Name:   Gonzalez Delgado YOB: 1995 Age:  24 year old MR Number:  [id number]   Step 1: Warning signs (Thoughts, images, mood, situation, behavior) that a crisis may be developin. Not staying in contact with family, probation.     2. Not going to work.     3. N/A     Step 2: Internal coping strategies - Things I can do to take my mind off of my problems without contacting another person (relaxation technique, physical activity):     1. Go for walk     2. Ride skateboard     3. Go thrifting     Step 3: People and social settings that provide distraction:     1. Name: Luis Eduardo   Phone: [phone]   2. Name: Berenice   Phone: [phone]   3. Place: Work   4. Place: Brothers house, or parents house     The one thing that is most important to me and worth living for is: My own health, my own success, my son.     Step 4: People whom I can ask for help:     1. Name: Luis Eduardo   Phone: [phone]     2. Name: Berenice   Phone: [phone]     3. Name: N/A   Phone: N/A     Step 5: Professionals or agencies I can contact during a crisis:     1. Clinician Name: iLzzy Tenorio MA, [facility], [facility]   Phone: [phone]   Clinician Pager or Emergency Contact #: [phone]     2. Clinician Name: None   Phone: None     Clinician Pager or Emergency Contact #: None     3. Local Urgent Care Services: [facility] Address :[address] [place] [address]  Urgent Care Services Phone: [phone]     4. Suicide Prevention Lifeline Phone: 7-028-496-YVAZ ([phone])     Step 6: Making the environment safe:     1. Maintain chemical free environment     2. Keep mind occupied with things I can work on.     Safety Plan Template 2008 [employer] and [employer] is reprinted with the express permission of the authors.  No portion of the Safety Plan Template may be reproduced without the express, written permission.  You can contact the authors at [email or web address] or  [email or web address].

## 2019-08-21 NOTE — PROGRESS NOTES
Initial Services Plan        Before your first treatment group, please do the following    Immediate health & safety concerns: No    Suggestions for client during the time between intake & completion of treatment plan:  Tour your treatment center (unit or outpatient clinic).  Complete the problem list for your treatment plan.    Client issues to be addressed in the first treatment sessions:  Client will introduce himself to the group and talk about motivation for treatment for him.       Lizzy Tenorio MA, [facility], [facility]  8/21/2019  12:10 PM

## 2019-08-25 PROCEDURE — H2035 A/D TX PROGRAM, PER HOUR: HCPCS | Mod: HQ

## 2019-08-26 ENCOUNTER — HOSPITAL ENCOUNTER (OUTPATIENT)
Dept: BEHAVIORAL HEALTH | Facility: CLINIC | Age: 24
End: 2019-08-26
Attending: SOCIAL WORKER
Payer: COMMERCIAL

## 2019-08-26 NOTE — PROGRESS NOTES
Gonzalez Delgado  [id number]               Adult CD Progress Note and Treatment Plan Review     Attendance     Monday    Group Date: 8/26/2019   Group Attendance Attended group session   Group Therapy Type Psychoeducation and Psychotherapeutic   Group Topic Covered Boundaries, Communication and Relationships   Client's Response To Group Topic Cooperative with task.   Client Group Participation Detail Shows interest   Group Attendance (Time) 3.0 Hours   Individual Attendance None   Family Attendance None   Other Comments/Information None      Tuesday     Group Date: 8/27/2019   Group Attendance Attended group session   Group Therapy Type Psychoeducation and Psychotherapeutic   Group Topic Covered Neurobiology of addiction   Client's Response To Group Topic Cooperative with task.   Client Group Participation Detail Shows interest   Group Attendance (Time) 3.0 Hours   Individual Attendance 30 Minutes   Family Attendance None   Other Comments/Information None      Wednesday    Group Date: 8/28/2019   Group Attendance Attended group session   Group Therapy Type Psychoeducation and Psychotherapeutic   Group Topic Covered Grandiosity and Perfectionism   Client's Response To Group Topic Cooperative with task.   Client Group Participation Detail Highly involved   Group Attendance (Time) 3.0 Hours   Individual Attendance None   Family Attendance None   Other Comments/Information None     Total # of Phase 1 Group Sessions: 3     Total # of Phase 2 Group Sessions: N/A  Total # of Phase 3 Group Sessions: N/A  Total # of 1:1 Sessions: 1    Support group attended this week: no    Reporting sobriety: Yes    Treatment Plan Review     Treatment Plan Review completed on:  8/26/2019    Projected discharge date: 1/6/2020    Client preferred learning style: Visual  Hands on  Verbal  Demonstration    Staff member(s) contributing: Lizzy Tenorio MA, LADC, LPCC     Received supervision: Yes  I staffed client's case with the treatment team  which included; Kenya Callahan, MSJOHN PAUL, [facility], [facility]. I reviewed clients treatment goals and their progress towards these goals. No changes in client's treatment plan are needed at this time.    Client involvement with treatment planning: contributed to goals and plan.    Client received copy of plan/revised plan: Yes    Client agrees with plan/revised plan: Yes    Changes to Treatment Plan: Yes    Client's Dimension 1 Goal(s) 1. Develop effective strategies to maintain abstinence.   Goal not addressed this week.   Client's Dimension 2 Goal(s) 1. Maintain good physical health. Goal not addressed this week.   Client's Dimension 3 Goal(s) 1. Client will verbalize the many aspects of how their mental health and chemical health impact each other. See below.   Client's Dimension 4 Goal(s) 1. Client will verbalize the benefits of this outpatient treatment program.  2. Client will demonstrate healthy recovery skills daily.  3. Client will verbalize an understanding of substance abuse disorders.  Goal not addressed this week.   Client's Dimension 5 Goal(s) 1. Client will demonstrate three new coping strategies.  Goal not addressed this week.   Client's Dimension 6 Goal(s) 1. Client will expand his sober support network by three people.   2. Client will resolve all legal issues. See below.     New Goals added since last review: Yes - Client is new to treatment and initiatl treatment plan was created.    Goal(s) worked on since last review: Client will verbalize the many aspects of how their mental health and chemical health impact each other. Client will expand his sober support network by three people.     Strategies effective: Yes    Treatment Coordination Activities: None.    Medical, Mental Health and other appointments the client attended: Yes - Client and I completed his updated DA..    Medication issues: None.    Physical and mental health problems: See below.    Review and evaluation of the individual abuse prevention  "plan: The program's individual abuse prevention plan (IAPP) is sufficient for this client.     Substance Use Disorders:    303.90 (F10.20) Alcohol Use Disorder Severe  304.30 (F12.20) Cannabis Use Disorder Severe  305.10 (F17.200) Tobacco Use Disorder Severe    ASAM Risk Rating: (Note the rationale for risk rating changes)    Dimension 1 0 No change    Dimension 2 0 No change    Dimension 3 1 No change    Dimension 4 3 No change    Dimension 5 3 No change      Dimension 6 2 No change    Data:   Client denies any intoxication or withdrawal. Client denies any major medical issues that need direct care or intervention at this time. Client presents with a diagnosis of Substance induced anxiety disorder.  Client completed their most recent DA on 8/27/2019. Client rates their anxiety at a 9 on a scale of 1 - 10 with 10 being the highest. Client describes his anxiety as \"wondering what outpatient is about?\". Client also stated he is excited to \"get things going to succeed\". Client's protective factors include; son, group, and work. Client's potential risk factors include; being around others who are using, using and lack of healthy coping skills. Client denies any suicidal thought, plan or intent this week. Client also denies any thoughts or behaviors of self harm this week.  Client rates their motivation at a 9 on a scale of 1 - 10 with 10 being the highest. Client reports their main motivation for recovery is \"success, comfort with in life\".  Client reports that currently \"negative people\" is blocking their motivation for sobriety. Client reports their cravings at a \"0\" on a scale of 1 - 10 with 10 being the highest. Client reports \"staying productive\" helps minimize their cravings. Client reports their biggest trigger this week was \"negativity at work\". Client states they expanded their sober support network this week by \"came to outpatient\".  Client reports attending no meetings this week. Client does not have a " sponsor. Client is currently employed. Client reports the following legal issues; DUI, felony 3rd degree assault. Client is currently living in an environment conducive to recovery; he is currently living alone with the hopes of getting to a place where his son could come live with him.     Client reports taking the following medications; none    No current outpatient medications on file.     No current facility-administered medications for this encounter.      Facility-Administered Medications Ordered in Other Encounters   Medication     Self Administer Medications: Behavioral Services       Latest PHQ-9: Client scored 2 and indicated the symptoms are; not difficult at all to their daily living.  Latest FRANK-7: Client scored 3 and indicated the symptoms are; not difficult at all to their daily living.    Intervention:     Monday: Today we talked about relationships, boundaries, assertiveness skills and balance. We also talked about how negative self-talk can impact our confidence and follow through in situations we know need to be addressed. Client talked about some relationships in their life that could use some work. Client was open to feedback about the need to focus on emotions, mindset and the messages they sent themselves around these relationships and how it impacts the relationship as a whole. I also talked with client about how these negative self-talk statements impact their self-esteem and motivation to make changes. We talked about how hard it is to set healthy boundaries initially and that over time it gets easier and we start to feel the benefits of a more stable, less chaotic day to day living. We did a group activity in which each person wrote on a note card 10 positive things about themselves and 10 things they are grateful for. They will take this note card and place it in their wallets to look at and remind themselves of positive qualities and things in their life when they are having bad days or  struggling with negative self-talk. Client liked the idea of having something tangible as a daily reminder to look at.     Tuesday: Today we talked about the neurobiology of addiction and mental health. Client watched the video,  Medical Aspects of Chemical Dependency  to further illustrate the neurobiology aspect of addiction and mental health. Client was then able to share what they learned about how vital the brain is to the process of recovery; both physically and mentally. Client also participated in a group activity  ABC Coping Skills . Client was able to identify over 26 ideas for coping skills that would help decrease cravings, increase motivation, provide healthy distraction and overall self- care activities to help when they are struggling or at risk. Client shared their list with the group and was able to take in additional activities suggested by peers in the group to add to their list.     Wednesday: Today we talked about grandiosity and perfectionism and how these defenses or character defects impact our addiction and recovery process. We talked about how these become blocks to our recovery and push others away from helping us. Client was asked to share how they could relate to these defenses or character defects and examples from their life. We also talked about how self-esteem ties in to these and most character defects. Client was open to learning and a bit resistant to admitting how they related. Client also participated in a mindfulness walk. We talked about the importance of being aware of our senses and what is going on around us. Client shared that he felt relaxed and calm after doing this and thinks it may be a good tool for him to use in th future.     Assessment:    Stages of Change Model  Contemplation    Client presents open and willing. He has started to share of himself and seems to use the group for support.     Plan:   Client will attend 2 - 3 support meetings this week.  Client will  continue attending all individual and group sessions as recommended by staff.  Client will use their note card as a tangible tool and reminder when struggling.  Client will practice healthy communication while setting healthy boundaries to help create balance in their life.    Client will utilize one coping skill from their list daily.  Client will practice a mindfulness walk once over the weekend.       Lizzy Tenorio MA, LADC, [facility]  Licensed Psychotherapist

## 2019-08-27 ENCOUNTER — HOSPITAL ENCOUNTER (OUTPATIENT)
Dept: BEHAVIORAL HEALTH | Facility: CLINIC | Age: 24
End: 2019-08-27
Attending: SOCIAL WORKER
Payer: COMMERCIAL

## 2019-08-27 PROCEDURE — H2035 A/D TX PROGRAM, PER HOUR: HCPCS

## 2019-08-27 PROCEDURE — H2035 A/D TX PROGRAM, PER HOUR: HCPCS | Mod: HQ

## 2019-08-27 NOTE — PROGRESS NOTES
Case consultation:  D)  Therapist presented ct's case.  DOC Alcohol and marijuana.  Hx of ADHD.  Substance induced anxiety.  Has just started group.  Shares openly.  Appears motivated and engaged.  Ct is isolated.  P)  Help ct develop sober support system.    Kenya Callahan, LELIA, LICSW, [facility]  Clinical

## 2019-08-28 ENCOUNTER — HOSPITAL ENCOUNTER (OUTPATIENT)
Dept: BEHAVIORAL HEALTH | Facility: CLINIC | Age: 24
End: 2019-08-28
Attending: SOCIAL WORKER
Payer: COMMERCIAL

## 2019-08-28 PROCEDURE — H2035 A/D TX PROGRAM, PER HOUR: HCPCS | Mod: HQ

## 2019-08-29 NOTE — ADDENDUM NOTE
Encounter addended by: Lizzy Tenorio LPCC on: 8/29/2019 9:58 AM   Actions taken: Sign clinical note, Visit Navigator Flowsheet section accepted

## 2019-09-03 ENCOUNTER — HOSPITAL ENCOUNTER (OUTPATIENT)
Dept: BEHAVIORAL HEALTH | Facility: CLINIC | Age: 24
End: 2019-09-03
Attending: SOCIAL WORKER
Payer: COMMERCIAL

## 2019-09-03 PROCEDURE — H2035 A/D TX PROGRAM, PER HOUR: HCPCS | Mod: HQ

## 2019-09-03 NOTE — PROGRESS NOTES
Gonzalez Delgado  [id number]               Adult CD Progress Note and Treatment Plan Review     Attendance     Monday    Group Date: 9/2/2019   Group Attendance Excused from group session   Group Therapy Type Absent from group   Group Topic Covered Absent from group   Client's Response To Group Topic Absent from group.   Client Group Participation Detail Absent from group   Group Attendance (Time) None - Absent from group   Individual Attendance None   Family Attendance None   Other Comments/Information None      Tuesday     Group Date: 9/3/2019   Group Attendance Attended group session   Group Therapy Type Psychoeducation and Psychotherapeutic   Group Topic Covered Family   Client's Response To Group Topic Cooperative with task.   Client Group Participation Detail Shows interest   Group Attendance (Time) 3.0 Hours   Individual Attendance None   Family Attendance None   Other Comments/Information None      Wednesday    Group Date: 9/4/2019   Group Attendance Attended group session   Group Therapy Type Psychoeducation and Psychotherapeutic   Group Topic Covered Family   Client's Response To Group Topic Cooperative with task.   Client Group Participation Detail Highly involved   Group Attendance (Time) 3.0 Hours   Individual Attendance None   Family Attendance None   Other Comments/Information None     Total # of Phase 1 Group Sessions: 5     Total # of Phase 2 Group Sessions: N/A  Total # of Phase 3 Group Sessions: N/A  Total # of 1:1 Sessions: 1    Support group attended this week: no    Reporting sobriety: Yes    Treatment Plan Review     Treatment Plan Review completed on:  9/5/2019    Projected discharge date: 1/6/2020    Client preferred learning style: Visual  Hands on  Verbal  Demonstration    Staff member(s) contributing: Lizzy Tenorio MA, LADC, LPCC     Received supervision: No    Client involvement with treatment planning: contributed to goals and plan.    Client received copy of plan/revised plan:  Yes    Client agrees with plan/revised plan: Yes    Changes to Treatment Plan: No    Client's Dimension 1 Goal(s) 1. Develop effective strategies to maintain abstinence.   Goal not addressed this week.   Client's Dimension 2 Goal(s) 1. Maintain good physical health. Goal not addressed this week.   Client's Dimension 3 Goal(s) 1. Client will verbalize the many aspects of how their mental health and chemical health impact each other. See below.   Client's Dimension 4 Goal(s) 1. Client will verbalize the benefits of this outpatient treatment program.  2. Client will demonstrate healthy recovery skills daily.  3. Client will verbalize an understanding of substance abuse disorders.  Goal not addressed this week.   Client's Dimension 5 Goal(s) 1. Client will demonstrate three new coping strategies.  Goal not addressed this week.   Client's Dimension 6 Goal(s) 1. Client will expand his sober support network by three people.   2. Client will resolve all legal issues. See below.     New Goals added since last review: None    Goal(s) worked on since last review: Client will verbalize the many aspects of how their mental health and chemical health impact each other. Client will expand his sober support network by three people.     Strategies effective: Yes    Treatment Coordination Activities: None.    Medical, Mental Health and other appointments the client attended: None    Medication issues: None.    Physical and mental health problems: See below.    Review and evaluation of the individual abuse prevention plan: The program's individual abuse prevention plan (IAPP) is sufficient for this client.     Substance Use Disorders:    303.90 (F10.20) Alcohol Use Disorder Severe  304.30 (F12.20) Cannabis Use Disorder Severe  305.10 (F17.200) Tobacco Use Disorder Severe    ASAM Risk Rating: (Note the rationale for risk rating changes)    Dimension 1 0 No change    Dimension 2 0 No change    Dimension 3 1 No change    Dimension 4 3 No  "change    Dimension 5 3 No change      Dimension 6 2 No change    Data:   Client denies any intoxication or withdrawal. Client denies any major medical issues that need direct care or intervention at this time. Client presents with a diagnosis of Substance induced anxiety disorder.  Client completed their most recent DA on 8/27/2019. Client rates their anxiety at a 4-6 and his depressive symptoms at a 6-7 on a scale of 1 - 10 with 10 being the highest. Client describes his anxiety as \"work, being places on time\" and his depression as \"being away from son, accepting situation\". Client's protective factors include; son, group, and work. Client's potential risk factors include; being around others who are using, using and lack of healthy coping skills. Client denies any suicidal thought, plan or intent this week. Client also denies any thoughts or behaviors of self harm this week.  Client rates their motivation at a 8 on a scale of 1 - 10 with 10 being the highest. Client reports their main motivation for recovery is \"success, stable life\".  Client reports that currently \"negative people, negative thoughts\" is blocking their motivation for sobriety. Client reports their cravings at a \"5\" on a scale of 1 - 10 with 10 being the highest. Client reports \"staying busy\" helps minimize their cravings. Client reports their biggest trigger this week was \"walking with negative thoughts\".  Client reports attending no meetings this week. Client does not have a sponsor. Client is currently employed. Client reports the following legal issues; DUI, felony 3rd degree assault. Client is currently living in an environment conducive to recovery; he is currently living alone with the hopes of getting to a place where his son could come live with him.     Client reports taking the following medications; none    No current outpatient medications on file.     No current facility-administered medications for this encounter.  "     Facility-Administered Medications Ordered in Other Encounters   Medication     Self Administer Medications: Behavioral Services       Latest PHQ-9: Client scored 2 and indicated the symptoms are; not difficult at all to their daily living.  Latest FRANK-7: Client scored 3 and indicated the symptoms are; not difficult at all to their daily living.    Intervention:     Monday: Holiday  No group    Tuesday: Today we talked about the disease of addiction being a family disease. We talked about the progressive nature of this disease and how our families have been affected by our use; albeit in different ways. We talked about how we tried to hide our use from our families and the resulting consequences on our relationships with those same people. We talked about how similar our stories are even though we all come from different family dynamics and different walks of life.   Wednesday: We continued talking about the family disease of addiction. We shared how our relationships have been impacted by our using and the progress of restoring trust after the damage was caused by our addiction.        Assessment:    Stages of Change Model  Contemplation    Client presents open and willing.     Plan:   Client will attend 2 - 3 support meetings this week.  Client will continue attending all individual and group sessions as recommended by staff.  Client will talk with their family about how they have been impacted by their use.  Client will ask their family how they can build trust back with them now that they are sober.       Lizzy Tenorio MA, LADC, LPCC  Licensed Psychotherapist

## 2019-09-04 ENCOUNTER — HOSPITAL ENCOUNTER (OUTPATIENT)
Dept: BEHAVIORAL HEALTH | Facility: CLINIC | Age: 24
End: 2019-09-04
Attending: SOCIAL WORKER
Payer: COMMERCIAL

## 2019-09-04 PROCEDURE — H2035 A/D TX PROGRAM, PER HOUR: HCPCS | Mod: HQ

## 2019-09-05 NOTE — ADDENDUM NOTE
Encounter addended by: Lizzy Tenorio LPCC on: 9/5/2019 11:48 AM   Actions taken: Visit Navigator Flowsheet section accepted, Sign clinical note

## 2019-09-09 ENCOUNTER — HOSPITAL ENCOUNTER (OUTPATIENT)
Dept: BEHAVIORAL HEALTH | Facility: CLINIC | Age: 24
End: 2019-09-09
Attending: SOCIAL WORKER
Payer: COMMERCIAL

## 2019-09-09 PROCEDURE — H2035 A/D TX PROGRAM, PER HOUR: HCPCS | Mod: HQ
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
## 2019-09-10 ENCOUNTER — HOSPITAL ENCOUNTER (OUTPATIENT)
Dept: BEHAVIORAL HEALTH | Facility: CLINIC | Age: 24
End: 2019-09-10
Attending: SOCIAL WORKER
Payer: COMMERCIAL

## 2019-09-10 PROCEDURE — H2035 A/D TX PROGRAM, PER HOUR: HCPCS | Mod: HQ

## 2019-09-11 ENCOUNTER — HOSPITAL ENCOUNTER (OUTPATIENT)
Dept: BEHAVIORAL HEALTH | Facility: CLINIC | Age: 24
End: 2019-09-11
Attending: SOCIAL WORKER
Payer: COMMERCIAL

## 2019-09-11 PROCEDURE — H2035 A/D TX PROGRAM, PER HOUR: HCPCS | Mod: HQ

## 2019-09-16 ENCOUNTER — HOSPITAL ENCOUNTER (OUTPATIENT)
Dept: BEHAVIORAL HEALTH | Facility: CLINIC | Age: 24
End: 2019-09-16
Attending: SOCIAL WORKER
Payer: COMMERCIAL

## 2019-09-16 PROCEDURE — H2035 A/D TX PROGRAM, PER HOUR: HCPCS

## 2019-09-16 PROCEDURE — H2035 A/D TX PROGRAM, PER HOUR: HCPCS | Mod: HQ

## 2019-09-16 NOTE — PROGRESS NOTES
"  Acknowledgement of Current Treatment Plan - Additional Entries     ADDITIONAL GOALS AND INTERVENTIONS:    Signatures/dates required for any additional Problems, Goals, and/or Interventions added to treatment plan:    Change/Addition in Dimension 3 on date: 9/16/2019    Insert here:   Date Assigned Source Area of Treatment Focus / Goal / Treatment Strategies Target  Date Initials Outcome Date Completed      9/16/2019 Self -  Current  Area of Treatment Focus:   Client meets criteria for substance induced anxiety.     Goal:  Client will verbalize a decrease in anxiety symptoms now that he is sober.     Treatment Strategies:  (Note: Frequency of all strategies are once unless specifically noted.)     Client will practice the \"Thought Stopping\" exercise and report back it's effectiveness in therapy.     Client will complete the \"Tools for Anxiety\" worksheet and report back to therapy.                                      9/30/2019           10/21/2019                                     LUCIO VALDES                                                            I have participated in creating this change and/or addition to my treatment plan copied above. I have been given a copy of this signature page with change/addition to my treatment plan and I am in agreement with how it is written in the electronic record.       Gonzalez Delgado    ________________________ 9/16/2019  Client Name    Signature    Date      Last use date if changed: 5/10/2019    "

## 2019-09-16 NOTE — PROGRESS NOTES
"INDIVIDUAL THERAPY NOTE    Data: Client talked about how much he appreciates the opportunity to be in group and learn and grow with others who are in the same or similar position as he is. He talked about how others sharing makes it easier for him to share of himself. He talked about group being a \"natural high\" for him.      Intervention: I talked with him about his treatment plan. He admits he has looked at a few of his assignments but has not completed any of them. We reviewed the past due assignments and I asked him to complete them over the next week and have ready to present in group. We also added his mental health treatment plan. I talked with him about how life changes so much once we are sober and how enlightening situations can be when we open ourselves up to new opportunities. Client and I also talked about the upcoming changes with a new staff person starting and he states he is going to remain open and expressed some of his fears.       Assessment: Client seems open and willing. He is procrastinating a bit on his treatment work and it will be important for him to complete this work and present it for further education, insight and overall understanding of his strengths and growth areas.       Plan: Client will complete past due treatment work. He will continue to participate in all group and individual sessions. We scheduled a follow up appointment in two weeks for individual therapy.   "
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
## 2019-09-17 ENCOUNTER — HOSPITAL ENCOUNTER (OUTPATIENT)
Dept: BEHAVIORAL HEALTH | Facility: CLINIC | Age: 24
End: 2019-09-17
Attending: SOCIAL WORKER
Payer: COMMERCIAL

## 2019-09-17 PROCEDURE — H2035 A/D TX PROGRAM, PER HOUR: HCPCS | Mod: HQ

## 2019-09-18 ENCOUNTER — HOSPITAL ENCOUNTER (OUTPATIENT)
Dept: BEHAVIORAL HEALTH | Facility: CLINIC | Age: 24
End: 2019-09-18
Attending: SOCIAL WORKER
Payer: COMMERCIAL

## 2019-09-19 NOTE — ADDENDUM NOTE
Encounter addended by: Lizzy Tenorio LPCC on: 9/19/2019 8:16 AM   Actions taken: Visit Navigator Flowsheet section accepted, Pend clinical note

## 2019-09-23 ENCOUNTER — HOSPITAL ENCOUNTER (OUTPATIENT)
Dept: BEHAVIORAL HEALTH | Facility: CLINIC | Age: 24
End: 2019-09-23
Attending: SOCIAL WORKER
Payer: COMMERCIAL

## 2019-09-23 LAB
AMPHETAMINES UR QL SCN: NEGATIVE
BARBITURATES UR QL: NEGATIVE
BENZODIAZ UR QL: NEGATIVE
CANNABINOIDS UR QL SCN: NEGATIVE
COCAINE UR QL: NEGATIVE
ETHANOL UR QL SCN: NEGATIVE
OPIATES UR QL SCN: NEGATIVE
PCP UR QL SCN: NEGATIVE

## 2019-09-23 PROCEDURE — 80320 DRUG SCREEN QUANTALCOHOLS: CPT

## 2019-09-23 PROCEDURE — H2035 A/D TX PROGRAM, PER HOUR: HCPCS | Mod: HQ

## 2019-09-23 PROCEDURE — 80307 DRUG TEST PRSMV CHEM ANLYZR: CPT

## 2019-09-23 NOTE — PROGRESS NOTES
Gonzalez Delgado  [id number]               Adult CD Progress Note and Treatment Plan Review     Attendance     Monday    Group Date: 9/23/2019   Group Attendance Attended group session   Group Therapy Type Psychoeducation and Psychotherapeutic   Group Topic Covered Motivation   Client's Response To Group Topic Cooperative with task Listened actively   Client Group Participation Detail Highly involved and Shows interest   Group Attendance (Time) 3.0 Hours   Individual Attendance None   Family Attendance None   Other Comments/Information None      Tuesday     Group Date: 9/24/2019   Group Attendance Attended group session   Group Therapy Type Psychoeducation and Psychotherapeutic   Group Topic Covered Need for change   Client's Response To Group Topic Cooperative with task.   Client Group Participation Detail Shows interest   Group Attendance (Time) 3.0 Hours   Individual Attendance None   Family Attendance None   Other Comments/Information None      Wednesday    Group Date: 9/25/2019   Group Attendance Attended group session   Group Therapy Type Psychoeducation and Psychotherapeutic   Group Topic Covered REBT   Client's Response To Group Topic Cooperative with task.   Client Group Participation Detail Highly involved   Group Attendance (Time) 3.0 Hours   Individual Attendance None   Family Attendance None   Other Comments/Information None     Total # of Phase 1 Group Sessions: 14    Total # of Phase 2 Group Sessions: N/A  Total # of Phase 3 Group Sessions: N/A  Total # of 1:1 Sessions: 2    Support group attended this week: no    Reporting sobriety: Yes    Treatment Plan Review     Treatment Plan Review completed on:  9/26/2019    Projected discharge date: 1/6/2020    Client preferred learning style: Visual  Hands on  Verbal  Demonstration    Staff member(s) contributing: Lizzy Tenorio MA, LADC, LPCC     Received supervision: Yes  I staffed client's case with the treatment team which included; LELIA Gracia,  [facility], [facility]. I reviewed clients treatment goals and their progress towards these goals. No changes in client's treatment plan are needed at this time.    Client involvement with treatment planning: contributed to goals and plan.    Client received copy of plan/revised plan: Yes    Client agrees with plan/revised plan: Yes    Changes to Treatment Plan: No    Client's Dimension 1 Goal(s) 1. Develop effective strategies to maintain abstinence.   Goal not addressed this week.   Client's Dimension 2 Goal(s) 1. Maintain good physical health. Goal not addressed this week.   Client's Dimension 3 Goal(s) 1. Client will verbalize the many aspects of how their mental health and chemical health impact each other. Goal not addressed this week.   Client's Dimension 4 Goal(s) 1. Client will verbalize the benefits of this outpatient treatment program.  2. Client will demonstrate healthy recovery skills daily.  3. Client will verbalize an understanding of substance abuse disorders.  See below.   Client's Dimension 5 Goal(s) 1. Client will demonstrate three new coping strategies.  Goal not addressed this week.   Client's Dimension 6 Goal(s) 1. Client will expand his sober support network by three people.   2. Client will resolve all legal issues. Goal not addressed this week.     New Goals added since last review: None    Goal(s) worked on since last review:   2. Client will demonstrate healthy recovery skills daily.    Strategies effective: Yes    Treatment Coordination Activities: None.    Medical, Mental Health and other appointments the client attended: None    Medication issues: None.    Physical and mental health problems: See below.    Review and evaluation of the individual abuse prevention plan: The program's individual abuse prevention plan (IAPP) is sufficient for this client.     Substance Use Disorders:    303.90 (F10.20) Alcohol Use Disorder Severe  304.30 (F12.20) Cannabis Use Disorder Severe  305.10 (F17.200) Tobacco  "Use Disorder Severe    [facility] Risk Rating: (Note the rationale for risk rating changes)    Dimension 1 0 No change    Dimension 2 0 No change    Dimension 3 1 No change    Dimension 4 3 No change    Dimension 5 3 No change      Dimension 6 2 No change    Data:   Client denies any intoxication or withdrawal. Client submitted an unobserved UA and a breathalyzer on 9/23/2019 and results were negative. Client denies any major medical issues that need direct care or intervention at this time. Client presents with a diagnosis of Substance induced anxiety disorder.  Client completed their most recent DA on 8/27/2019. Client rates his anxiety symptoms at a 6.5 on a scale of 1 - 10 with 10 being the highest. Client talked about how doing his treatment work has him anxious. We talked about ways to minimize anxiety and the group practiced a progressive relaxation activity.  Client's protective factors include; son, group, and work. Client's potential risk factors include; being around others who are using, using and lack of healthy coping skills. Client denies any suicidal thought, plan or intent this week. Client also denies any thoughts or behaviors of self harm this week.  Client rates their motivation at a 9 on a scale of 1 - 10 with 10 being the highest. Client reports their main motivation for recovery is \"steady success, my son, clear state of mind\".  Client reports that currently \"being in a rutt of self doubt\" is blocking their motivation for sobriety. Client reports their cravings at a \"6.5\" on a scale of 1 - 10 with 10 being the highest. Client states he was triggered by completing one of his treatment plan assignments and called his brother to come over for support. I validated his being solution focused and not engaging in action on that craving. Client reports \"cleaning, organizing place, calling my brother to come over\" helps minimize their cravings. Client reports their biggest trigger this week was \"thinking " "back on my life while using\".  Client reports attending no meetings this week. Client does not have a sponsor. Client is currently employed. Client reports the following legal issues; DUI, felony 3rd degree assault. Client is currently living in an environment conducive to recovery; he is currently living alone.    Client reports taking the following medications; none    No current outpatient medications on file.     No current facility-administered medications for this encounter.      Facility-Administered Medications Ordered in Other Encounters   Medication     Self Administer Medications: Behavioral Services       Latest PHQ-9: Client scored 2 and indicated the symptoms are; not difficult at all to their daily living.  Latest FRANK-7: Client scored 3 and indicated the symptoms are; not difficult at all to their daily living.    Intervention:     Monday: Today we talked about internal versus external motivation. We talked about what motivates us to quit and how this changes over time. We talked about the importance of having internal motivation to sustain recovery. We talked about the pros and cons of using. We also talked about the need for honesty and taking healthy risks in order for change to happen.      Tuesday - Today we talked about admitting versus accepting. We talked about how both are milestones in recovery and how one requires action. We talked about how it s easier to admit there is a problem then to take responsibility to do something different about that problem.     Wednesday - Today we talked about REBT ABC Model and how this can help us cognitively restructure our thoughts, help us understand our emotions and reactions and help us understand our need to take responsibility to learn new effective strategies when dealing with daily life stressors. Each client was asked to use this ABC Model to write out over the weekend and write out the model step by step for a specific situation and bring back to " group on Monday.     Assessment:    Stages of Change Model  Contemplation    Client shares openly. Client presents honest and discusses what is going on for him; both struggles and successes.     Plan:   Client will attend 2 - 3 support meetings this week.  Client will continue attending all individual and group sessions as recommended by staff.  Client will contemplate what external factors motivate them and what internal factors motivate them.  Client will identify what admitting and accepting means to them. They will identify what accepting would look like in their daily life.  Client will write out the ABC model for one situation over the weekend and report back to group next week.       Lizzy Tenorio MA, [facility], [facility]  Licensed Psychotherapist

## 2019-09-24 ENCOUNTER — HOSPITAL ENCOUNTER (OUTPATIENT)
Dept: BEHAVIORAL HEALTH | Facility: CLINIC | Age: 24
End: 2019-09-24
Attending: SOCIAL WORKER
Payer: COMMERCIAL

## 2019-09-24 PROCEDURE — H2035 A/D TX PROGRAM, PER HOUR: HCPCS | Mod: HQ

## 2019-09-25 ENCOUNTER — HOSPITAL ENCOUNTER (OUTPATIENT)
Dept: BEHAVIORAL HEALTH | Facility: CLINIC | Age: 24
End: 2019-09-25
Attending: SOCIAL WORKER
Payer: COMMERCIAL

## 2019-09-25 PROCEDURE — H2035 A/D TX PROGRAM, PER HOUR: HCPCS | Mod: HQ

## 2019-09-26 NOTE — ADDENDUM NOTE
Encounter addended by: Lizzy Tenorio LPCC on: 9/26/2019 9:44 AM   Actions taken: Clinical Note Signed, Visit Navigator Flowsheet section accepted

## 2019-09-26 NOTE — PROGRESS NOTES
Case consultation:  D)  Therapist presented ct's case.  DOC alcohol/THC.  Substance induced anxiety. Active participant.  Anxious about presenting assignments in group.  Therapist working with him to begin presenting and receiving support from peers.  Ct has demonstrated his ability to use tools he is learning.  P)  Focus on anxiety mgt while talking in group.    LELIA Gracia, LICSW, [facility]  Clinical

## 2019-09-30 ENCOUNTER — HOSPITAL ENCOUNTER (OUTPATIENT)
Dept: BEHAVIORAL HEALTH | Facility: CLINIC | Age: 24
End: 2019-09-30
Attending: SOCIAL WORKER
Payer: COMMERCIAL

## 2019-09-30 PROCEDURE — H2035 A/D TX PROGRAM, PER HOUR: HCPCS

## 2019-09-30 PROCEDURE — H2035 A/D TX PROGRAM, PER HOUR: HCPCS | Mod: HQ

## 2019-09-30 NOTE — PROGRESS NOTES
INDIVIDUAL THERAPY NOTE    Data: Client talked about how things are going for him. He talked about his low self esteem. He talked about how he tends to focus on outside things to make him feel better.       Intervention: I talked with client about self esteem, needing external things to feel better internally. I talked about how he may be someone who engages in self sabotaging behaviors and how this continues to be a block or barrier for him moving forward. We talked about how he tends to be reactive and impulsive. He also talked about how he is not feeling healthy physically. We talked about different strategies; most of which he declined to follow through with. I reminded him that he needs to decide what is going to motivate hm for change. We talked about a food journal and he was open to this idea to help curve his unhealthy food intake. He will do this and report back if it's helpful.    Assessment: Client seems to want to make changes however isn't always committed to behavioral changes to support the outcome he desires. He has some insight in to his growth areas and again struggles to engage in behavioral change.       Plan: Client will practice the food journal and report back what experience he had with this. We will continue to work on increase self esteem, decreasing impulsivity and increasing healthy coping strategies through individual therapy and group therapy.

## 2019-09-30 NOTE — PROGRESS NOTES
Gonzalez Delgado  [id number]               Adult CD Progress Note and Treatment Plan Review     Attendance     Monday    Group Date: 9/30/2019   Group Attendance Attended group session   Group Therapy Type Psychoeducation and Psychotherapeutic   Group Topic Covered Surrender   Client's Response To Group Topic Cooperative with task Discussed personal experience with topic Listened actively   Client Group Participation Detail Highly involved   Group Attendance (Time) 3.0 Hours   Individual Attendance 45 Minutes   Family Attendance None   Other Comments/Information None      Tuesday     Group Date: 10/1/2019   Group Attendance Attended group session   Group Therapy Type Psychoeducation   Group Topic Covered Mindfulness   Client's Response To Group Topic Listened actively   Client Group Participation Detail Highly involved   Group Attendance (Time) 3.0 Hours   Individual Attendance None   Family Attendance None   Other Comments/Information None      Wednesday    Group Date: 10/2/2019   Group Attendance Attended group session   Group Therapy Type Psychoeducation   Group Topic Covered Mindfulness   Client's Response To Group Topic Cooperative with task   Client Group Participation Detail Highly involved   Group Attendance (Time) 3.0 Hours   Individual Attendance None   Family Attendance None   Other Comments/Information None     Total # of Phase 1 Group Sessions: 17     Total # of Phase 2 Group Sessions: NA  Total # of Phase 3 Group Sessions: NA  Total # of 1:1 Sessions: 3    Support group attended this week: No    Reporting sobriety: Yes    Treatment Plan Review     Treatment Plan Review completed on:  9/30/2019     Projected discharge date: 1/6/2020    Client preferred learning style: Visual  Hands on  Demonstration    Staff member(s) contributing: DARREL Amaral     Received supervision: No.    Client involvement with treatment planning: contributed to goals and plan.    Client received copy of plan/revised plan:  Yes    Client agrees with plan/revised plan: Yes    Changes to Treatment Plan: No    Client's Dimension 1 Goal(s) 1. Develop effective strategies to maintain abstinence.   Goal not addressed this week.   Client's Dimension 2 Goal(s) 1. Maintain good physical health. Goal not addressed this week.   Client's Dimension 3 Goal(s) 1. Client will verbalize the many aspects of how their mental health and chemical health impact each other.  2. Client will verbalize a decrease in anxiety symptoms now that he is sober. Client presented assignment.  See below.   Client's Dimension 4 Goal(s) 1. Client will verbalize the benefits of this outpatient treatment program.  2. Client will demonstrate healthy recovery skills daily.   3. Client will verbalize an understanding of substance abuse disorders.  Client presented assignment.  See below.   Client's Dimension 5 Goal(s) 1. Client will demonstrate three new coping strategies.  See below.   Client's Dimension 6 Goal(s) 1. Client will expand his sober support network by three people.   2. Client will resolve all legal issues. Goal not addressed this week.     New Goals added since last review: No    Goal(s) worked on since last review:   Client will verbalize a decrease in anxiety symptoms now that he is sober.  Client will verbalize an understanding of substance abuse disorders.    Client will demonstrate three new coping strategies.     Strategies effective: Yes    Treatment Coordination Activities: None.    Medical, Mental Health and other appointments the client attended: Yes - Client attended individual mental health session.    Medication issues: None.    Physical and mental health problems: None.    Review and evaluation of the individual abuse prevention plan: The program's individual abuse prevention plan (IAPP) is sufficient for this client.     Substance Use Disorders:    303.90 (F10.20) Alcohol Use Disorder Severe  304.30 (F12.20) Cannabis Use Disorder Severe  305.10  "(F17.200) Tobacco Use Disorder Severe    ASAM Risk Rating: (Note the rationale for risk rating changes)    Dimension 1 0 No Change    Dimension 2 0 No Change    Dimension 3 1 No Change    Dimension 4 1 Client appears motivated for positive lifestyle change with active positive reinforcement. This has been demonstrated by active participation in group sessions this week, willingness to share assignments, and ability to verbalize healthy coping strategies he has implemented during the week    Dimension 5 3 No Change      Dimension 6 2 No Change    Data: (Need to include short narrative for the week on what was worked on)  Client attended all group sessions this week and was an active participant in each group. Client rates his cravings this week at a 1 out of 10. He reports thinking about his family and looking back at the \"true cost\" of his addiction as ways to minimize these cravings. Client presented assignment on the monetary cost of his use and was very surprised at the amount of money he had actually spent over the years on drugs and alcohol.   Client had no medical concerns this week and is not taking any medications at this time. Client rates his overall health at an 8 out of 10, reporting he believes he should lose weight. Client reports he is active and enjoys outside activities including basketball and was encouraged to continue participating in these activities.   Client denies suicidal thoughts or self injurious behaviors and does not endorse any anxiety or depression this week. Client was provided education on the ABC model of CBT and was able to share an example in group related to a situation he experienced at work. Client also reports practicing 'Thought Stopping\" techniques this week  Client reports his motivation for recovery at an 8 out of 10 and states \"steady happiness, success, and my boy\" as his main motivation this week. Client is able to identify greed and \"expectations\" as possible barriers " "to his recovery. Client completed assigned tasks for the week and reports feeling proud of himself.   Client reports his biggest trigger this week was thinking about completing treatment work, but was able to work through this and complete the work.  Client reports he will be attending the [employer] job fair on Friday, October 4th and is excited to explore other employment opportunities. Client did not attend any support groups this week and states this is because he did not \"set aside time\" to do this. Patient was encouraged to attend at least one support group and continue working towards obtaining a sponsor        Intervention:   Monday - Clients finished lesson on the ABC model of CBT. Client was able to apply this model to a situation he experienced at work.     Tuesday.. This weeks topic was mindfulness and client was provided education on the the goals of mindfulness practice, the definition if mindfulness, and the different mindfulness practices.     Wednesday - Clients continued on the topic of mindfulness and began reviewing core mindfulness skills. Clients were introduced to \"State of Mind\" and what this means. Clients then discussed \"What\" skills which is what an individual does when practicing mindfulness. Next week clients will continue with the \"How\" skills, exploring how to practice mindfulness    Assessment:    Stages of Change Model  Preparation/Determination    Appears/Sounds:  Cooperative  Motivated  Engaged    Plan:   -Continue to attend 2-3 sober support group meetings each week (this includes non-12-step/AA alternative meetings).  -Client will continue to work on treatment plan objectives.    DARREL Amaral  "

## 2019-10-01 ENCOUNTER — HOSPITAL ENCOUNTER (OUTPATIENT)
Dept: BEHAVIORAL HEALTH | Facility: CLINIC | Age: 24
End: 2019-10-01
Attending: SOCIAL WORKER
Payer: COMMERCIAL

## 2019-10-01 PROCEDURE — H2035 A/D TX PROGRAM, PER HOUR: HCPCS | Mod: HQ

## 2019-10-02 ENCOUNTER — HOSPITAL ENCOUNTER (OUTPATIENT)
Dept: BEHAVIORAL HEALTH | Facility: CLINIC | Age: 24
End: 2019-10-02
Attending: SOCIAL WORKER
Payer: COMMERCIAL

## 2019-10-02 PROCEDURE — H2035 A/D TX PROGRAM, PER HOUR: HCPCS | Mod: HQ

## 2019-10-02 NOTE — ADDENDUM NOTE
Enc[facility]: [facility] on: 10/2/2019 2:52 PM   Actions taken: Pend clinical note

## 2019-10-03 NOTE — ADDENDUM NOTE
Enc[facility]: [facility] on: 10/3/2019 10:05 AM   Actions taken: Clinical Note Signed

## 2019-10-03 NOTE — ADDENDUM NOTE
Encounter addended by: Lizzy Tenorio LPCC on: 10/3/2019 10:05 AM   Actions taken: Visit Navigator Flowsheet section accepted

## 2019-10-07 ENCOUNTER — HOSPITAL ENCOUNTER (OUTPATIENT)
Dept: BEHAVIORAL HEALTH | Facility: CLINIC | Age: 24
End: 2019-10-07
Attending: SOCIAL WORKER
Payer: COMMERCIAL

## 2019-10-07 PROCEDURE — H2035 A/D TX PROGRAM, PER HOUR: HCPCS | Mod: HQ

## 2019-10-07 NOTE — PROGRESS NOTES
Gonzalez Delgado  [id number]               Adult CD Progress Note and Treatment Plan Review     Attendance     Monday    Group Date: 10/7/2019   Group Attendance Attended group session   Group Therapy Type Psychoeducation   Group Topic Covered Wellness (8 Dimension)   Client's Response To Group Topic Cooperative with task Listened actively   Client Group Participation Detail Highly involved and Shows interest   Group Attendance (Time) 2.0 Hours   Individual Attendance None   Family Attendance None   Other Comments/Information None      Tuesday     Group Date: 10/8/2019   Group Attendance Attended group session   Group Therapy Type Psychoeducation and Psychotherapeutic   Group Topic Covered Wellness (Identified areas in need of improvement and areas of strength)   Client's Response To Group Topic Cooperative with task.   Client Group Participation Detail Shows interest   Group Attendance (Time) 3.0 Hours   Individual Attendance None   Family Attendance None   Other Comments/Information None      Wednesday    Group Date: 10/9/2019   Group Attendance Attended group session   Group Therapy Type Psychoeducation and Psychotherapeutic   Group Topic Covered Obesity and Diabetes   Client's Response To Group Topic Cooperative with task.   Client Group Participation Detail Highly involved   Group Attendance (Time) 3.0 Hours   Individual Attendance None   Family Attendance None   Other Comments/Information None     Total # of Phase 1 Group Sessions: 20    Total # of Phase 2 Group Sessions: N/A  Total # of Phase 3 Group Sessions: N/A  Total # of 1:1 Sessions: 2    Support group attended this week: no    Reporting sobriety: Yes    Treatment Plan Review     Treatment Plan Review completed on:  10/10/2019    Projected discharge date: 1/6/2020    Client preferred learning style: Visual  Hands on  Verbal  Demonstration    Staff member(s) contributing: Lizzy Tenorio MA, LILIANAC, LPCC, Ofelia SWEENEY, DARREL    Received supervision:  Yes  Client's case was staffed with treatment team including Lizzy Tenorio MA, LAD, Psychiatric and LELIA Gracia, [facility], [facility]. I reviewed clients treatment goals and their progress towards these goals. No changes in client's treatment plan are needed at this time.    Client involvement with treatment planning: contributed to goals and plan.    Client received copy of plan/revised plan: Yes    Client agrees with plan/revised plan: Yes    Changes to Treatment Plan: No    Client's Dimension 1 Goal(s) 1. Develop effective strategies to maintain abstinence.   Goal not addressed this week.   Client's Dimension 2 Goal(s) 1. Maintain good physical health. See below.   Client's Dimension 3 Goal(s) 1. Client will verbalize the many aspects of how their mental health and chemical health impact each other. Goal not addressed this week.   Client's Dimension 4 Goal(s) 1. Client will verbalize the benefits of this outpatient treatment program.  2. Client will demonstrate healthy recovery skills daily.  3. Client will verbalize an understanding of substance abuse disorders.  See below.   Client's Dimension 5 Goal(s) 1. Client will demonstrate three new coping strategies.  Goal not addressed this week.   Client's Dimension 6 Goal(s) 1. Client will expand his sober support network by three people.   2. Client will resolve all legal issues. See below.     New Goals added since last review: None    Goal(s) worked on since last review:   Client will demonstrate healthy recovery skills daily  Client will maintain physical health.  Client will expand sober network  Develop effective strategies to maintain abstinence.    Strategies effective: Yes    Treatment Coordination Activities: None.    Medical, Mental Health and other appointments the client attended: None    Medication issues: None.    Physical and mental health problems: See below.    Review and evaluation of the individual abuse prevention plan: The program's individual  "abuse prevention plan (IAPP) is sufficient for this client.     Substance Use Disorders:    303.90 (F10.20) Alcohol Use Disorder Severe  304.30 (F12.20) Cannabis Use Disorder Severe  305.10 (F17.200) Tobacco Use Disorder Severe    ASAM Risk Rating: (Note the rationale for risk rating changes)    Dimension 1 0 No change    Dimension 2 0 No change    Dimension 3 1 No change    Dimension 4 1 No change    Dimension 5 3 No change      Dimension 6 2 No change    Data:   Client denies any intoxication or withdrawal. Client submitted an unobserved UA and a breathalyzer on 10/8/2019 and results were negative. Client reports sleeping approximately 5.5 to 6 hours of sleep per night and rates his overall health as a 8.5 out of 10, with 10 being the best. Client reports he knows he should eat healthier and wants to lose a little weight. Client reports he is feeling good about treatment but sometimes struggles with negative self talk. He is working to catch himself when he says something negative about himself and correct it. Client reports his motivation for recovery is at an 8 out of 10 and identifies his son and \"success\" as being his current motivators. Client reports he is thankful for the relationship he has with his brother, his son, and God. Client reports he was triggered by playing video games late at night because he used to engage in this activity when he was using. Client talked about over sleeping and not being able to spend time with his son last week because he stayed up late. Client was able to see that not using is not the only change he has to commit to, and old behaviors are also in need of changing. Client did not attend any support group meetings but he has committed to attending at least one over the coming weekend. He will report how this went next week. Client was unable to attend the job fair last week because he was called into work. Client reported feeling frustrated about this but was able to cope " with his frustration by practicing thought stopping techniques. Client is compliant with conditions of probation and reports having a probation meeting next Tuesday     Client reports taking the following medications; none    No current outpatient medications on file.     No current facility-administered medications for this encounter.      Facility-Administered Medications Ordered in Other Encounters   Medication     Self Administer Medications: Behavioral Services       Latest PHQ-9: Client scored 2 and indicated the symptoms are; not difficult at all to their daily living.  Latest FRANK-7: Client scored 3 and indicated the symptoms are; not difficult at all to their daily living.    Intervention:     Monday: Today we wrapped up the topic of mindfulness with the  How s   of practicing being mindful which include  nonjudgementalness, one mindfulness, and effectiveness  Client was introduced to the eight dimensions of wellness to begin this week s  topic of wellness. Client received a wellness self-assessment to complete outside of group with instructions for completion. We will be reviewing results as the week progresses, focusing on areas of strength as well as where improvements can be made.     Tuesday: Client shared areas of strength and weakness from results of assessment and identified spirituality as an area of strength. Client reports he relies on his britt to help him through difficult moments. One area in need of improvement that client was able to identify was relationships/support. Client is aware he needs to continue to work on building his support network and has committed himself to attending a support group meeting this weekend    Wednesday - Patient received education on Obesity and Diabetes, including causes and health risks associated with these topics. We discussed easy ways to reduce personal risk which included being more conscious of the food we eat by checking the amount of sugar and  carbohydrates of the selections we make.     Assessment:    Stages of Change Model  Preparation/Determination    Client shares openly. Client presents honest and discusses what is going on for him; both struggles and successes.     Plan:   Client committed to attend at least 1 support meeting this weekend and will report back next week how it went  Client will be aware of what he purchases at the grocery store by checking labels for sugar and carbohydrate content    Ofelia SWEENEY, LADC

## 2019-10-08 ENCOUNTER — HOSPITAL ENCOUNTER (OUTPATIENT)
Dept: BEHAVIORAL HEALTH | Facility: CLINIC | Age: 24
End: 2019-10-08
Attending: SOCIAL WORKER
Payer: COMMERCIAL

## 2019-10-08 PROCEDURE — 80320 DRUG SCREEN QUANTALCOHOLS: CPT

## 2019-10-08 PROCEDURE — H2035 A/D TX PROGRAM, PER HOUR: HCPCS | Mod: HQ

## 2019-10-08 PROCEDURE — 80307 DRUG TEST PRSMV CHEM ANLYZR: CPT

## 2019-10-09 ENCOUNTER — HOSPITAL ENCOUNTER (OUTPATIENT)
Dept: BEHAVIORAL HEALTH | Facility: CLINIC | Age: 24
End: 2019-10-09
Attending: SOCIAL WORKER
Payer: COMMERCIAL

## 2019-10-09 PROCEDURE — H2035 A/D TX PROGRAM, PER HOUR: HCPCS | Mod: HQ

## 2019-10-09 NOTE — PROGRESS NOTES
Case consultation:  D)  Therapist reviewed ct's case.  DOC alcohol and marijuana.  Substance induced anxiety.  Ct states he enjoys group.  He participates well.  Therapist has identified issues of shame.  P)  Continue tx plan.    LELIA Gracia, LICSW, [facility]  Clinical .

## 2019-10-10 NOTE — ADDENDUM NOTE
Encounter addended by: Ofelia Beatty LADC on: 10/10/2019 7:43 AM   Actions taken: Pend clinical note

## 2019-10-10 NOTE — ADDENDUM NOTE
Encounter addended by: [facility] on: 10/10/2019 8:55 AM   Actions taken: Clinical Note Signed

## 2019-10-10 NOTE — ADDENDUM NOTE
[facility] on: 10/10/2019 2:14 PM   Actions taken: Clinical Note Signed

## 2019-10-10 NOTE — ADDENDUM NOTE
Encounter addended by: Lizzy Tenorio LPCC on: 10/10/2019 7:03 AM   Actions taken: Visit Navigator Flowsheet section accepted

## 2019-10-14 ENCOUNTER — HOSPITAL ENCOUNTER (OUTPATIENT)
Dept: BEHAVIORAL HEALTH | Facility: CLINIC | Age: 24
End: 2019-10-14
Attending: SOCIAL WORKER
Payer: COMMERCIAL

## 2019-10-14 PROCEDURE — H2035 A/D TX PROGRAM, PER HOUR: HCPCS

## 2019-10-14 PROCEDURE — H2035 A/D TX PROGRAM, PER HOUR: HCPCS | Mod: HQ

## 2019-10-14 NOTE — PROGRESS NOTES
MH Skills Group Progress Note   Name: Gonzalez Delgado  MR#: [id number]     Monday    Group Date: 10/14/2019   Group Attendance Attended group session   Group Therapy Type Psychoeducation and Psychotherapeutic   Group Topic Covered Grief   Client's Response To Group Topic Cooperative with task Listened actively   Client Group Participation Detail Highly involved   Group Attendance (Time) 3.0 Hours   Individual Attendance 45 Minutes   Family Attendance None   Other Comments/Information None      Total # of Mental Health Group Sessions: 1    DIMENSION 3:  Emotional/Behavioral/Cognitive Conditions and Complications  The degree to which any condition or complications are likely to interfere with treatment for substance abuse or with function in significant life areas and the likelihood of risk of harm to self or others.     Emotional/Behavioral - Current Risk Factor:  1    DSM-5 Diagnoses: Substance induced anxiety disorder.    Goal(s):   Client will verbalize a decrease in anxiety symptoms now that he is sober.  Client will verbalize the many aspects of how their mental health and chemical health impact each other.      Client reports taking the following medications; None     No current outpatient medications on file.     No current facility-administered medications for this encounter.      Facility-Administered Medications Ordered in Other Encounters   Medication     Self Administer Medications: Behavioral Services       Latest PHQ-9: Client scored 2 and indicated the symptoms are; not difficult at all to their daily living.  Latest FRANK-7: Client scored 3 and indicated the symptoms are; not difficult at all to their daily living.    Data: Client denies any intoxication or withdrawal. Client denies any major medical issues that need direct care or intervention at this time. Client presents with a diagnosis of Substance induced anxiety disorder.  Client completed their most recent DA on 8/27/2019. Client rates his anxiety  symptoms at a 6.5 on a scale of 1 - 10 with 10 being the highest. Client talked about how his upcoming legals (court tomorrow) is causing him some anxiety. We processed ways to minimize the anxiety and understand what he has control over and what he does not. Client also talked about how proud he was that he followed through with attending his support meeting from last week and experienced the meeting to be supportive and fulfilling. Client's protective factors include; son, group, and work. Client's potential risk factors include; being around others who are using, low self esteem, and negative self talk. Client denies any suicidal thought, plan or intent this week. Client also denies any thoughts or behaviors of self harm this week.     Intervention:   Today we talked about the 5 stages of grief. Client watched an DadaT video on grief and participated in group discussion answering several questions regarding the video. We talked about primary and secondary grief and how this relates to our mental health and chemical health. Client was then asked to write a goodbye letter to their addiction to share in group tomorrow. Client was engaged in the group discussion and shared how they have grieved as a result of their addiction and outside of their addiction. We talked about how when we are using we tend to not process our grief and so it s common that these feelings come to surface when we get clean and sober. We talked about the importance of sharing these emotions and not covering them up.     Assessment:    Stages of Change Model: Client is currently in the Stages of Change Model  Contemplation stage within the stages of change model.     Client presents motivated, engaged and willing. He continues to struggle with negative self talk.     Plan:   Client will continue with individual therapy as recommended throughout treatment.   Client will write out his goodbye letter to his addiction to have prepared to share in  group Tuesday. -*8    Lizzy Tenorio MA, LADC, LPCC  Licensed Psychotherapist

## 2019-10-14 NOTE — PROGRESS NOTES
INDIVIDUAL THERAPY NOTE    Data: Client talked about how he wants to pursue a career in car sales. 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 He talked about what he has learned so far regarding filing a company with the , applying for bonds and buying cars from [place]. He talked about being unsatisfied at his current place of employment and really wanting to get a different job that is more fulfilling and appreciative of him.       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 I suggested he get a job working at a different dealership as it sounds like there is a lot to learn about the ins and outs of owning a business. I talked with him about how owning your own business is not as easy as it sounds. 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 I talked with him about some of the intricate details like; staffing, property, taxes, insurance, payroll and other various operation costs that he was missing to help him realize that his endeavor was perhaps bigger then he realized. We talked about pursuing passion and finding happiness professionally and making sure to not jump into anything without knowing what he is getting in to. We talked about the potential benefits of working in the industry to help him understand more of the ins and outs before taking on too much. We also reviewed his treatment plan and there are several assignments he is behind on. We reviewed the assignments and I made sure client had copies of all of them; he needed a new copy of one. We talked about how he needs to work on these assignments and be prepared to share; both individual and group assignments. Client also talked about how he wants to start thinking about and getting ready to transition to phase 2. I talked with him  about this being a good idea and to start working on what he has learned, his progress and what his goals for the next phase would be.       Assessment: Client may be under estimating what it takes to own a car dealership. He shows a lot of passion and excitement about new opportunities and possibilities and that is good for him as it seems to propel him in the future with hope and excitement. Client tends to be a bit unorganized and procrastinate with his treatment work so this will need to be a focus.      Plan: Client will pursue employment opportunities at a car dealership to learn more about the potential of owning his own lot. He will complete past due treatment work and be prepared to share; both individual and group assignments. He will work on his transition paper and present this when he believes he is ready.

## 2019-10-16 ENCOUNTER — HOSPITAL ENCOUNTER (OUTPATIENT)
Dept: BEHAVIORAL HEALTH | Facility: CLINIC | Age: 24
End: 2019-10-16
Attending: SOCIAL WORKER
Payer: COMMERCIAL

## 2019-10-16 PROCEDURE — H2035 A/D TX PROGRAM, PER HOUR: HCPCS | Mod: HQ

## 2019-10-16 NOTE — PROGRESS NOTES
Gonzalez Delgado  [id number]               Adult CD Progress Note and Treatment Plan Review     Attendance       Tuesday     Group Date: 10/15/2019   Group Attendance Excused from group session   Group Therapy Type Absent from group   Group Topic Covered Absent from group   Client's Response To Group Topic Absent from group.   Client Group Participation Detail Absent from group   Group Attendance (Time) None - Absent from group   Individual Attendance None   Family Attendance None   Other Comments/Information None      Wednesday    Group Date: 10/16/2019   Group Attendance Attended group session   Group Therapy Type Psychoeducation and Psychotherapeutic   Group Topic Covered Emotions and feelings   Client's Response To Group Topic Cooperative with task Listened actively   Client Group Participation Detail Highly involved   Group Attendance (Time) 3.0 Hours   Individual Attendance None   Family Attendance None   Other Comments/Information None     Total # of Phase 1 Group Sessions: 21    Total # of Phase 2 Group Sessions: N/A  Total # of Phase 3 Group Sessions: N/A  Total # of 1:1 Sessions: 2    Support group attended this week: yes    Reporting sobriety: Yes    Treatment Plan Review     Treatment Plan Review completed on:  10/17/2019    Projected discharge date: 1/6/2020    Client preferred learning style: Visual  Hands on  Verbal  Demonstration    Staff member(s) contributing: Ofelia SWEENEY, [facility]    Received supervision: No    Client involvement with treatment planning: contributed to goals and plan.    Client received copy of plan/revised plan: Yes    Client agrees with plan/revised plan: Yes    Changes to Treatment Plan: No    Client's Dimension 1 Goal(s) 1. Develop effective strategies to maintain abstinence.   See below.   Client's Dimension 2 Goal(s) 1. Maintain good physical health. Goal not addressed this week.   Client's Dimension 3 Goal(s) 1. Client will verbalize the many aspects of how their mental  "health and chemical health impact each other. Goal not addressed this week.   Client's Dimension 4 Goal(s) 1. Client will verbalize the benefits of this outpatient treatment program.  2. Client will demonstrate healthy recovery skills daily.  3. Client will verbalize an understanding of substance abuse disorders.  See below.   Client's Dimension 5 Goal(s) 1. Client will demonstrate three new coping strategies.  Goal not addressed this week.   Client's Dimension 6 Goal(s) 1. Client will expand his sober support network by three people.   2. Client will resolve all legal issues. See below.     New Goals added since last review: None    Goal(s) worked on since last review:   Client will demonstrate healthy recovery skills daily  Client will expand sober network  Develop effective strategies to maintain abstinence.    Strategies effective: Yes    Treatment Coordination Activities: None.    Medical, Mental Health and other appointments the client attended: None    Medication issues: None.    Physical and mental health problems: See below.    Review and evaluation of the individual abuse prevention plan: The program's abuse prevention plan (VERNA) is sufficient for this client.     Substance Use Disorders:    303.90 (F10.20) Alcohol Use Disorder Severe  304.30 (F12.20) Cannabis Use Disorder Severe  305.10 (F17.200) Tobacco Use Disorder Severe    ASAM Risk Rating: (Note the rationale for risk rating changes)    Dimension 1 0 No change    Dimension 2 0 No change    Dimension 3 1 No change    Dimension 4 1 No change    Dimension 5 3 No change      Dimension 6 2 No change    Data:   Client was excused from group on Tuesday due to mandatory court appearance. Client reports it went well and he feels relieved to \"have court over with\" Client reports sleeping approximately 6 hours per night and rates his overall health as a 8 out of 10, with 10 being the best. . Client reports his motivation for recovery is at an 9 out of 10 and " "identifies his son and \"success\" as being his current motivators. Client reports he is thankful for the relationship he has with his brother, his son, and God. Client reports he was triggered this week when having dinner with his brother because his brother ordered a glass of wine. Client reports he \"just pushed through\" the anxiety and felt better once dinner was over. The group suggested talking with his brother and explaining the anxiety he feels being around alcohol this early in recovery. Client agreed this would be helpful and knows his brother would understand. We also discussed how asking for what we need is equally important as meeting other people's needs. Client reports attending 1 support group this week and states he felt very \"serene\" afterwards.     Client reports taking the following medications; none    No current outpatient medications on file.     No current facility-administered medications for this encounter.      Facility-Administered Medications Ordered in Other Encounters   Medication     Self Administer Medications: Behavioral Services       Latest PHQ-9: Client scored 2 and indicated the symptoms are; not difficult at all to their daily living.  Latest FRANK-7: Client scored 3 and indicated the symptoms are; not difficult at all to their daily living.    Intervention:   Tuesday - Excused from group due to mandatory court appearance  Wednesday - Client participated in interactive presentation on [facility] Window of self awareness. Client charted his personal window and received feedback from group    Assessment:    Stages of Change Model  Preparation/Determination    Client shares openly. Client presents as honest and discusses what is going on for him; both struggles and successes.     Plan:   Client will talk with family about his anxiety when he is around alcohol this early in recovery    Ofelia SWEENEY, LADC  "

## 2019-10-18 NOTE — ADDENDUM NOTE
Encounter addended by: Ofelia Beatty [facility] on: 10/18/2019 12:32 PM   Actions taken: Clinical Note Signed

## 2019-10-21 ENCOUNTER — TELEPHONE (OUTPATIENT)
Dept: BEHAVIORAL HEALTH | Facility: CLINIC | Age: 24
End: 2019-10-21

## 2019-10-21 ENCOUNTER — HOSPITAL ENCOUNTER (OUTPATIENT)
Dept: BEHAVIORAL HEALTH | Facility: CLINIC | Age: 24
End: 2019-10-21
Attending: SOCIAL WORKER
Payer: COMMERCIAL

## 2019-10-21 PROCEDURE — H2035 A/D TX PROGRAM, PER HOUR: HCPCS | Mod: HQ

## 2019-10-21 NOTE — PROGRESS NOTES
MH Skills Group Progress Note   Name: Gonzalez Delgado  MR#: [id number]     Monday    Group Date: 10/14/2019   Group Attendance Attended group session   Group Therapy Type Psychoeducation and Psychotherapeutic   Group Topic Covered Relationships, communication, and boundaries   Client's Response To Group Topic Cooperative with task Listened actively   Client Group Participation Detail Highly involved   Group Attendance (Time) 3.0 Hours   Individual Attendance None   Family Attendance None   Other Comments/Information None      Total # of Mental Health Group Sessions: 2    DIMENSION 3:  Emotional/Behavioral/Cognitive Conditions and Complications  The degree to which any condition or complications are likely to interfere with treatment for substance abuse or with function in significant life areas and the likelihood of risk of harm to self or others.     Emotional/Behavioral - Current Risk Factor:  1    DSM-5 Diagnoses: Substance induced anxiety disorder.    Goal(s):   Client will verbalize a decrease in anxiety symptoms now that he is sober.  Client will verbalize the many aspects of how their mental health and chemical health impact each other.      Client reports taking the following medications; None     No current outpatient medications on file.     No current facility-administered medications for this encounter.      Facility-Administered Medications Ordered in Other Encounters   Medication     Self Administer Medications: Behavioral Services       Latest PHQ-9: Client scored 2 and indicated the symptoms are; not difficult at all to their daily living.  Latest FRANK-7: Client scored 3 and indicated the symptoms are; not difficult at all to their daily living.    Data: Client presents with a diagnosis of Substance induced anxiety disorder.  Client completed their most recent DA on 8/27/2019. Client rates his anxiety symptoms at a 5.5 on a scale of 1 - 10 with 10 being the highest. Client talked about some anxiety  symptoms that he experiences at night. He states he will think about what he is going to do the next day. He states that he plays video games, listens to music and will work on his treatment work to keep busy and decrease the thoughts. He also talked about looking for a new job and wanting to get his drivers license back. We talked about the importance of action and follow through and how accomplishing these goals can increase his moods. Client's protective factors include; son, group, and work. Client's potential risk factors include; being around others who are using, low self esteem, and negative self talk. Client denies any suicidal thought, plan or intent this week. Client also denies any thoughts or behaviors of self harm this week.     Intervention:   Today we talked about how client s relationships have been impacted by mental health and chemical health. We outlined, as a group, healthy and unhealthy characteristics in relationships. Client was asked to identify the 10 most recent people they have talked to and identify what type of relationship this is; healthy, unhealthy, needs work, angry, and/or confusing. Client learned assertive communication skills, and had an opportunity to practice these skills in role playing situations. Client participated in a group activity titled  back to back  exercise to help understand how much is involved in communication past the words we use. We talked about how non-verbal communication and reflective listening is vital to understanding and confirming the information we intended to communicate was received. Client was educated on understand healthy boundaries, and to be able to identify codependent behaviors with in relationships. We talked about the different styles of boundaries; rigid, porous, and healthy. Client identified as having rigid boundaries and talked about how they have been hurt so many times in the past they tend to not trust. Client was engaged and  attentive during group and verbalized gained insight in to how relationships have been impacted by both mental health and chemical health. They also verbalized gained insight in to healthy and unhealthy characteristics in relationships, types of boundaries and healthy, assertive communication.     Assessment:    Stages of Change Model: Client is currently in the Stages of Change Model  Contemplation stage within the stages of change model.     Client struggles with follow through. He verbalizes gained insight and seems to have some self awareness and needs to work on following through.     Plan:   Client will continue with individual therapy as recommended throughout treatment.   Client will practice the assertiveness formula and report back to group on Tuesday. Client will continue to explore relationships in their life and ways to improve these relationships as it relates to communication, boundaries and if they are engaging in or allowing unhealthy relationship characteristics to continue.       Lizzy Tenorio MA, LADC, LPCC  Licensed Psychotherapist

## 2019-10-23 ENCOUNTER — HOSPITAL ENCOUNTER (OUTPATIENT)
Dept: BEHAVIORAL HEALTH | Facility: CLINIC | Age: 24
End: 2019-10-23
Attending: SOCIAL WORKER
Payer: COMMERCIAL

## 2019-10-23 PROCEDURE — H2035 A/D TX PROGRAM, PER HOUR: HCPCS | Mod: HQ

## 2019-10-23 PROCEDURE — H2035 A/D TX PROGRAM, PER HOUR: HCPCS

## 2019-10-23 PROCEDURE — 80320 DRUG SCREEN QUANTALCOHOLS: CPT

## 2019-10-23 PROCEDURE — 80307 DRUG TEST PRSMV CHEM ANLYZR: CPT

## 2019-10-23 NOTE — PROGRESS NOTES
INDIVIDUAL THERAPY NOTE    Data:  Client talked about his job situation and feeling as though he needs to work on accepting and becoming comfortable in his current position. At least until he can get his drivers license and have some freedom to get around and look else where. He talked about how he thought about why he was fulfilled at other jobs and not this job. He admits that the other jobs were commission based and so there was drive and incentive. 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 He talked about liking to know there is growth for him and he wants to help someone else have that drive and incentive to get promotions.       Intervention: I commended client for his insight and understanding about his recent job dissatisfaction and his commitment to accept where he is and work on finding his own drive and incentive since this job doesn't have it built in around sales like previous jobs. I talked with him about his overall progress and where he feels he is at. He talked about how much better he feels on days he has group and how much motivation he has to be here and progress. I talked with about initiative and follow through. I encouraged him to start taking on more responsibility for his addiction as this is the ultimate goal as he works on transitioning to lower levels of care. He admits he knows he needs to start attending meetings, engage in sober activities and meet new people. I also talked with him about his need to follow through with he dentist stuff. He admits he has been procrastinating. We talked about the excuses he uses to procrastinate and how this negatively impacts him.       Assessment: Client does well with engaging and participating in individual and group discussions. He struggles with procrastination and creating his own structure.       Plan: Client will schedule a dentist appointment to get his tooth fixed. He will continue  with individual therapy. He will work on past due and coming up due treatment work and be ready to present next group and individual sessions. Client will continue to work for finding internal drive and acceptance at his current place of employment; he will try mentoring one of his supervisees to see if that helps.

## 2019-10-23 NOTE — PROGRESS NOTES
Gonzalez Delgado  [id number]               Adult CD Progress Note and Treatment Plan Review     Attendance       Tuesday     Group Date: 10/22/2019   Group Attendance Excused from group session   Group Therapy Type Absent from group   Group Topic Covered Absent from group   Client's Response To Group Topic Absent from group.   Client Group Participation Detail Absent from group   Group Attendance (Time) None - Absent from group   Individual Attendance None   Family Attendance None   Other Comments/Information None      Wednesday    Group Date: 10/23/2019   Group Attendance Attended group session   Group Therapy Type Psychoeducation and Psychotherapeutic   Group Topic Covered Bonds and relationships   Client's Response To Group Topic Cooperative with task Listened actively   Client Group Participation Detail Highly involved   Group Attendance (Time) 3.0 Hours   Individual Attendance None   Family Attendance None   Other Comments/Information None     Total # of Phase 1 Group Sessions: 22    Total # of Phase 2 Group Sessions: N/A  Total # of Phase 3 Group Sessions: N/A  Total # of 1:1 Sessions: 2    Support group attended this week: no    Reporting sobriety: Yes    Treatment Plan Review     Treatment Plan Review completed on:  10/25/2019    Projected discharge date: 1/6/2020    Client preferred learning style: Visual  Hands on  Verbal  Demonstration    Staff member(s) contributing: Ofelia SWEENEY, [facility]    Received supervision: No    Client involvement with treatment planning: contributed to goals and plan.    Client received copy of plan/revised plan: Yes    Client agrees with plan/revised plan: Yes    Changes to Treatment Plan: No    Client's Dimension 1 Goal(s) 1. Develop effective strategies to maintain abstinence.   See below.   Client's Dimension 2 Goal(s) 1. Maintain good physical health. See below.   Client's Dimension 3 Goal(s) 1. Client will verbalize the many aspects of how their mental health and chemical  "health impact each other. Goal not addressed this week.   Client's Dimension 4 Goal(s) 1. Client will verbalize the benefits of this outpatient treatment program.  2. Client will demonstrate healthy recovery skills daily.  3. Client will verbalize an understanding of substance abuse disorders.  See below.   Client's Dimension 5 Goal(s) 1. Client will demonstrate three new coping strategies.  See below.   Client's Dimension 6 Goal(s) 1. Client will expand his sober support network by three people.   2. Client will work towards resolving legal issues. Goal not addressed this week.     New Goals added since last review: None    Goal(s) worked on since last review:   Client will demonstrate healthy recovery skills daily  Develop effective strategies to maintain abstinence.  Client will demonstrate three new coping strategies.   Maintain good physical health.    Strategies effective: Yes    Treatment Coordination Activities: None.    Medical, Mental Health and other appointments the client attended: 1 session with Psychotherapist, Lizzy Tenorio MA, [facility] [place], [facility]    Medication issues: None.    Physical and mental health problems: None.    Review and evaluation of the individual abuse prevention plan: The program's abuse prevention plan (VERNA) is sufficient for this client.     Substance Use Disorders:    303.90 (F10.20) Alcohol Use Disorder Severe  304.30 (F12.20) Cannabis Use Disorder Severe  305.10 (F17.200) Tobacco Use Disorder Severe    ASAM Risk Rating: (Note the rationale for risk rating changes)    Dimension 1 0 No change    Dimension 2 0 No change    Dimension 3 1 No change    Dimension 4 1 No change    Dimension 5 3 No change      Dimension 6 2 No change    Data:   Client submitted UA on 10/23/19. Preliminary results were negative for illicit substances. Client rates his cravings at a 3.5 out of 10 this week and practiced positive self talk to \"get over it\". Client was excused from group on Tuesday due to not " "feeling well. Client reports sleeping approximately 6.5 hours per night and rates his overall health as a 7.5 out of 10, with 10 being the best. . Client reports he is in need of getting his wisdom teeth removed as one is really bothering him. Client reports his  motivation for recovery is at an 8 out of 10 and identifies his son and \"spiritual clarity\" as being his current motivators. Client reports he is thankful for the relationship he has with his brother, his son, and God. Client reports experiencing anxiety this week, mostly at night, because he thinks he is \"overlooking goals\" at times.Client reports he actively practiced mindfulness to \"get through it\" Client reports he played basketball with his brother and was proud of himself for \"winning at 20\". Client reports he did not talk to his brother about his anxiety around alcohol as he had planned because \"it didn't come up\". Client was encouraged to find time and talk to his brother before it happens again. Client reports he did not attend any support groups due to his work schedule but states his brother is \"like a mentor\" for him and he felt supported this week.  Client reports taking the following medications; none    No current outpatient medications on file.     No current facility-administered medications for this encounter.      Facility-Administered Medications Ordered in Other Encounters   Medication     Self Administer Medications: Behavioral Services       Latest PHQ-9: Client scored 2 and indicated the symptoms are; not difficult at all to their daily living.  Latest FRANK-7: Client scored 3 and indicated the symptoms are; not difficult at all to their daily living.    Intervention:   Tuesday - Excused from group due to mandatory court appearance  Wednesday - Client participated in a group bonding activity as well as identifying healthy characteristics of relationships    Assessment:    Stages of Change Model  Preparation/Determination    Client " shares openly. Client presents as honest and discusses what is going on for him; both struggles and successes.     Plan:   Continued from last week.. Client will talk with family about his anxiety when he is around alcohol this early in recovery  Client will fill out combined application form to see if he qualifies for General Assistance or SNAP benefits    Ofelia SWEENEY, [facility]

## 2019-10-28 ENCOUNTER — HOSPITAL ENCOUNTER (OUTPATIENT)
Dept: BEHAVIORAL HEALTH | Facility: CLINIC | Age: 24
End: 2019-10-28
Attending: SOCIAL WORKER
Payer: COMMERCIAL

## 2019-10-28 PROCEDURE — H2035 A/D TX PROGRAM, PER HOUR: HCPCS | Mod: HQ

## 2019-10-28 PROCEDURE — H2035 A/D TX PROGRAM, PER HOUR: HCPCS

## 2019-10-28 NOTE — PROGRESS NOTES
"INDIVIDUAL THERAPY NOTE    Data: Client talked about his treatment plan assignment \"Tools for Anxiety\". He also talked about his request to transition to phase 2. He talked about the meeting he attended and how good he felt afterwards; even though he saw people he knew from work there (which was his worst fear). He talked about how he knows he needs to follow through with a dentist appointment but still hasn't.      Intervention: I talked with him about his Tools for Anxiety assignment. He shared insights gained from completing this assignment and reports a significant decrease in anxiety symptoms overall. He admits that the anxiety he does experience is manageable and seems normal. We also reviewed his paper outlining his progress, and readiness to transition to phase 2. We talked about his need to take on more initiative outside of treatment and work on his follow through as these areas will prove risk for him if he doesn't. He talked about how his goals for the next phase are; attend more meetings, work on developing and connecting with a Higher Power, self confidence, relationships, meeting new people and sharing his emotions. We talked about finishing up phase 1 this week and transitioning to Phase 2 next week. I talked with him about his need to show, with behaviors, he is ready for the step down by taking on more responsibility for his recovery outside of group time; complete treatment work on time, attend support meetings, develop a sober support network, and work on expanding sober activities.      Assessment: Client presents motivated during group time however struggles with follow through outside of group time. Client verbalizes decrease mental health symptoms and increase peace and happiness since starting group.      Plan: Client will transition to phase 2 starting next week. He will schedule a dentist appointment. He will complete past due treatment work and share in group/individual. He will attend " regular support meetings and expand his sober support network.

## 2019-10-28 NOTE — PROGRESS NOTES
MH Skills Group Progress Note   Name: Gonzalez Delgado  MR#: [id number]     Monday    Group Date: 10/14/2019   Group Attendance Attended group session   Group Therapy Type Psychoeducation and Psychotherapeutic   Group Topic Covered Family disease   Client's Response To Group Topic Cooperative with task Listened actively   Client Group Participation Detail Highly involved   Group Attendance (Time) 3.0 Hours   Individual Attendance 1 Hour   Family Attendance None   Other Comments/Information None      Total # of Mental Health Group Sessions: 3  Total # of Mental Health Individual Sessions: 5    DIMENSION 3:  Emotional/Behavioral/Cognitive Conditions and Complications  The degree to which any condition or complications are likely to interfere with treatment for substance abuse or with function in significant life areas and the likelihood of risk of harm to self or others.     Emotional/Behavioral - Current Risk Factor:  1    DSM-5 Diagnoses: Substance induced anxiety disorder.    Goal(s):   Client will verbalize a decrease in anxiety symptoms now that he is sober.  Client will verbalize the many aspects of how their mental health and chemical health impact each other.      Client reports taking the following medications; None     No current outpatient medications on file.     No current facility-administered medications for this encounter.      Facility-Administered Medications Ordered in Other Encounters   Medication     Self Administer Medications: Behavioral Services       Latest PHQ-9: Client scored 1 and indicated the symptoms are; not difficult at all to their daily living.  Latest FRANK-7: Client scored  and indicated the symptoms are; not difficult at all to their daily living.    Data: Client presents with a diagnosis of Substance induced anxiety disorder.  Client completed their most recent DA on 8/27/2019. Client rates his anxiety symptoms at a 3.5 on a scale of 1 - 10 with 10 being the highest. Client talked  about some anxiety symptoms He talked about how his is anxious about how his PO is not calling him back despite several messages. He talked about how last time this happened he ended up with a warrant out for his arrest. I encouraged him to call again, then call to see if he could speak with a supervisor. He talked about how proud he was that he attended a support meeting this week.  Client's protective factors include; son, group, and work. Client's potential risk factors include; being around others who are using, low self esteem, and negative self talk. Client denies any suicidal thought, plan or intent this week. Client also denies any thoughts or behaviors of self harm this week.     Intervention:   Today we talked about the family disease of addiction. We spend time talking about the natural progression of addiction, the definition of a disease and how chemical dependency meets these criteria, neurobiology of addiction, the criteria to meet dependence, cross addiction, hitting bottom, communication skills, codependency, enmeshment, enabling behaviors, and trust. We talked about how family members are affected by this disease even if they aren t using themselves. We talked about the web of addiction and how complicated this can be to unravel while we are getting clean and sober. We talked about the importance of patience and understanding during this time; for ourselves and those around us. Client talked about how helpful it was to learn about the criteria to meet chemical dependency and how this relates to their family.     Assessment:    Client is currently in the contemplative stage of change. We are working on increasing client's awareness of their addiction by teaching about the family disease of addiction to gain awareness of how others are impacted by what he does.     Client seems motivated and engaged during group however continues to struggle with follow through.       Plan:   Client will continue  with individual therapy as recommended throughout treatment.   Client will teach their family what they are learning about the family disease of addiction. Client will practice patience with themselves and others as they are learning new skills on the path to recovery.       Lizzy Tenorio MA, LADC, LPCC  Licensed Psychotherapist

## 2019-10-29 ENCOUNTER — HOSPITAL ENCOUNTER (OUTPATIENT)
Dept: BEHAVIORAL HEALTH | Facility: CLINIC | Age: 24
End: 2019-10-29
Attending: SOCIAL WORKER
Payer: COMMERCIAL

## 2019-10-29 PROCEDURE — H2035 A/D TX PROGRAM, PER HOUR: HCPCS | Mod: HQ

## 2019-10-29 NOTE — PROGRESS NOTES
Gonzalez Delgado  [id number]               Adult CD Progress Note and Treatment Plan Review     Attendance       Tuesday     Group Date: 10/29/2019   Group Attendance Attended group session   Group Therapy Type Psychoeducation   Group Topic Covered Family Dynamics   Client's Response To Group Topic Cooperative with task Listened actively   Client Group Participation Detail Shows interest and Adequate participation   Group Attendance (Time) 3.0 Hours   Individual Attendance None   Family Attendance None   Other Comments/Information None      Wednesday    Group Date: 10/30/2019   Group Attendance Attended group session   Group Therapy Type Psychoeducation   Group Topic Covered Family Dynamics and Relapse Prevention   Client's Response To Group Topic Cooperative with task Listened actively   Client Group Participation Detail Highly involved   Group Attendance (Time) 3.0 Hours   Individual Attendance None   Family Attendance None   Other Comments/Information None     Total # of Phase 1 Group Sessions: 24    Total # of Phase 2 Group Sessions: N/A  Total # of Phase 3 Group Sessions: N/A  Total # of 1:1 Sessions: 2    Support group attended this week: no    Reporting sobriety: Yes    Treatment Plan Review     Treatment Plan Review completed on:  10/29/2019    Projected discharge date: 1/6/2020    Client preferred learning style: Visual  Hands on  Verbal  Demonstration    Staff member(s) contributing: Ofelia Beatty BS, [facility]    Received supervision: No    Client involvement with treatment planning: contributed to goals and plan.    Client received copy of plan/revised plan: Yes    Client agrees with plan/revised plan: Yes    Changes to Treatment Plan: No    Client's Dimension 1 Goal(s) 1. Develop effective strategies to maintain abstinence.   See below.   Client's Dimension 2 Goal(s) 1. Maintain good physical health. Goal not addressed this week.   Client's Dimension 3 Goal(s) 1. Client will verbalize the many aspects of how  their mental health and chemical health impact each other. Goal not addressed this week.   Client's Dimension 4 Goal(s) 1. Client will verbalize the benefits of this outpatient treatment program.  2. Client will demonstrate healthy recovery skills daily.  3. Client will verbalize an understanding of substance abuse disorders.  See below.   Client's Dimension 5 Goal(s) 1. Client will demonstrate three new coping strategies.  See below.   Client's Dimension 6 Goal(s) 1. Client will expand his sober support network by three people.   2. Client will work towards resolving legal issues. Goal not addressed this week.     New Goals added since last review: None    Goal(s) worked on since last review:   Client will demonstrate healthy recovery skills daily  Develop effective strategies to maintain abstinence.  Client will demonstrate three new coping strategies.       Strategies effective: Yes    Treatment Coordination Activities: None.    Medical, Mental Health and other appointments the client attended: None    Medication issues: None.    Physical and mental health problems: None.    Review and evaluation of the individual abuse prevention plan: The program's abuse prevention plan (VERNA) is sufficient for this client.     Substance Use Disorders:    303.90 (F10.20) Alcohol Use Disorder Severe  304.30 (F12.20) Cannabis Use Disorder Severe  305.10 (F17.200) Tobacco Use Disorder Severe    ASAM Risk Rating: (Note the rationale for risk rating changes)    Dimension 1 0 No change    Dimension 2 0 No change    Dimension 3 1 No change    Dimension 4 1 No change    Dimension 5 3 No change      Dimension 6 2 No change    Data:   Client submitted UA on 10/23/19. Preliminary results were negative for illicit substances. Client reports no cravings or triggers this week. Client reports sleeping approximately 6 hours per night and rates his overall health as a 8 out of 10, with 10 being the best. Client reports his  motivation for  "recovery continues to be at an 8 out of 10 and identifies his son and \"spiritual clarity\" as being his current motivators. Client reports he is thankful for the relationship he has with his brother, his son, and God. Client reports he is looking forward to spending time with his son and taking him trick or treating this week. Client reports he attended 1 support group this week and it went well. He is planning to attend again. Patient states \"probation is giving me anxiety\" but could not really identify a reason. Client reports he is practicing \"centering himself\" and staying in the present as strategies to manage this anxiety.      Intervention:   Tuesday - Today we discussed family roles in addiction. Client completed \"What is your role\" assessment and believes he was often in the role of Scapegoat and Lost Child. Client was able to share how he often didn't ask for what he needed because he didn't always feel worthy.    Wednesday - We completed topic of family roles in addiction and client was provided examples of the chaos created within the family unit when a loved one is in active addiction. We also reviewed negative feelings associated with our role which can trigger use and possible coping strategies to avoid returning to use  Assessment:    Stages of Change Model  Preparation/Determination    Client shares openly. Client presents as honest and discusses what is going on for him; both struggles and successes.     Plan:   Client will attend the same support group meeting he attended last week and report back to group next week  Client will return combined application form to see if he qualifies for General Assistance or SNAP benefits    Ofelia Beatty BS, LADC  "

## 2019-10-30 ENCOUNTER — HOSPITAL ENCOUNTER (OUTPATIENT)
Dept: BEHAVIORAL HEALTH | Facility: CLINIC | Age: 24
End: 2019-10-30
Attending: SOCIAL WORKER
Payer: COMMERCIAL

## 2019-10-30 PROCEDURE — H2035 A/D TX PROGRAM, PER HOUR: HCPCS | Mod: HQ

## 2019-10-31 NOTE — ADDENDUM NOTE
Encounter addended by: Ofelia Beatty LADC on: 10/31/2019 3:09 PM   Actions taken: Pend clinical note

## 2019-11-04 ENCOUNTER — HOSPITAL ENCOUNTER (OUTPATIENT)
Dept: BEHAVIORAL HEALTH | Facility: CLINIC | Age: 24
End: 2019-11-04
Attending: SOCIAL WORKER
Payer: COMMERCIAL

## 2019-11-04 PROCEDURE — H2035 A/D TX PROGRAM, PER HOUR: HCPCS | Mod: HQ

## 2019-11-04 NOTE — PROGRESS NOTES
[facility] INTENSIVE OUTPATIENT PROGRAM  TRANSITION PROGRESS NOTE     Patient: Gonzalez Delgado  MRN; [id number]   : 1995  Age: 24 year old Sex: male  IOP ADMISSION DATE: 2019  TRANSITION DATE: 2019  TRANSITIONING FROM: Phase 1 TO: Phase 2  SUBSTANCE USE DISORDERS:   303.90 (F10.20) Alcohol Use Disorder Severe    LAST USE DATE: 5/10/2019    Treatment Plan Review completed on:  2019     Attendance Grid:     Monday    Group Date: 19   Group Attendance Attended group session   Group Therapy Type Psychoeducation and Psychotherapeutic   Group Topic Covered Emotions/Expression/Feelings and Thinking Errors/Negative Self-Talk   Client Participation/Contribution Cooperative with task   Client Participation Detail Highly involved and Shows interest   Attendance 3.0 Hours   Individual Attendance None   Family Attendance None   Other Comments/Information None      Tuesday     Group Date: 19   Group Attendance Attended group session   Group Therapy Type Psychoeducation   Group Topic Covered Communication   Client Participation/Contribution Cooperative with task   Client Participation Detail Highly involved and Shows interest   Attendance 3.0 Hours   Individual Attendance None   Family Attendance None   Other Comments/Information None       Total # of Phase 1 Group Sessions: 24     Total # of Phase 2 Group Sessions: 2  Total # of Phase 3 Group Sessions: N/A  Total # of 1:1 Sessions: 2  Length of stay/projected discharge date: 2020    Support group attended this week: no    Reporting sobriety: Yes               Learning Style(s):    Hands on    Staff member contributing: DARREL Amaral     Received supervision: No.    Client contributed to goals and plan: Yes    Client received a signed copy of treatment plan/revised plan: Yes    Changes to Treatment Plan: Yes. Goals have been updated.    Client agrees with plan/revised plan: Yes    Any changes in Vulnerable Adult Status:  No    Treatment Coordination Activities: No.    Medical, Mental Health and other appointments the client attended: No.    Medication issues: No.    Physical and mental health problems: No.    Review and evaluation of the individual abuse prevention plan: Yes. Reviewed. VERNA continues to be effective    Dimension One: Acute Intoxication/Withdrawal Potential     Risk at admission to IOP: 0. Risk at transition to Phase 2: Risk Ratin.     Treatment plan goal:   Develop effective strategies to maintain sobriety. Goal Status: CONTINUE.     Current ASAM criteria: no withdrawal risk.     Treatment Summary/Justification of Transition DIM 1: Client has remained abstinent since 5/10/19. He does not endorse any withdrawal symptoms and shows no signs of intoxication. All UA's since admission have been negative for the use of illicit chemicals. Most current UA was collected 19. Client's lifestyle appears congruent with his desire to remain abstinent.    Dimension Two: Biomedical Conditions and Complaints     Risk at admission to IOP: 0. Risk at transition to Phase 2: Risk Ratin.     Treatment plan goal:   Maintain good physical health.. CONTINUE.     Current ASAM criteria: none.     Treatment Summary/Justification of Transition DIM 2:  Client appears to be maintaining good physical health and engages in regular exercise including basketball and walking. Client seeks medical attention when needed and is in the process of obtaining health insurance  Dimension Three: Emotional/Behavioral/Cognitive Conditions and Complications     Risk at admission to IOP: 2. Risk at transition to Phase 2: Risk Ratin.     Treatment plan goal:   Client will verbalize the many aspects of how their mental health and chemical health impact each other. COMPLETE.        Current ASAM criteria: manageable     Treatment Summary/Justification of Transition DIM 3:Client is generally functioning pretty well and reports having positive  relationships with his parents and brother. Client is able to verbalize how his use has affected his mental health including decreased mood and feelings of low/no self worth. Client continues to work towards increasing self esteem and confidence and verbalizes the desire to development of meaningful relationships with others        Dimension Four: Readiness to Change     Risk at admission to IOP: 2. Risk at transition to Phase 2: Risk Ratin.     Treatment plan goal:   Client will verbalize the benefits of this outpatient treatment program.. COMPLETE.    Client will demonstrate healthy recovery skills daily.. CONTINUE  Client will verbalize an understanding of substance abuse disorders.. COMPLETE       Current ASAM criteria: cooperative, motivated and engaged in treatment.     Treatment Summary/Justification of Transition DIM 4: Client appears to be be between preparation and action stage of change. He verbalizes a desire to maintain abstinence and increase meaning in his life and his behaviors are consistent with this    Dimension Five: Relapse/Continues Use/Continues Problem Potential     Risk at admission to IOP: 3. Risk at transition to Phase 2: Risk Ratin.     Treatment plan goal:   Client will demonstrate three new coping strategies. . COMPLETE.        Current ASAM criteria: moderate risk of relapse     Treatment Summary/Justification of Transition DIM 5: Client is able to verbalize external triggers to use and can identify and reports implementing coping strategies do manage external triggers. Client continues to work towards understanding internal/emotional triggers and developing a realistic and healthy plan to manage these triggers long term.    Dimension Six: Recovery Environment     Risk at admission to IOP: 2. Risk at transition to Phase 2: Risk Ratin.     Treatment plan goal:   Client will expand his sober support network by three people. . CHANGED TO Client will continue to build sober  network an engage in sober activities with other sober individuals.   Client will work towards resolving legal issues.. CHANGED TO Client will remain compliant with conditions of probation     Current ASAM criteria: can cope with structure and support     Treatment Summary/Justification of Transition DIM 6: Client is exploring support groups and continues working to build sober support network. Client has remained compliant with conditions of probation and has stable employment. Client is rebuilding relationship with his family and his young son.   PLAN: Transition client to Phase 2 effective 11/4/19 due to justifications listed in above dimensions.   Client currently meets the ASAM criteria for; Outpatient (ASAM level 1)  level of care.    Ofelia Beatty [facility]

## 2019-11-05 ENCOUNTER — HOSPITAL ENCOUNTER (OUTPATIENT)
Dept: BEHAVIORAL HEALTH | Facility: CLINIC | Age: 24
End: 2019-11-05
Attending: SOCIAL WORKER
Payer: COMMERCIAL

## 2019-11-05 PROCEDURE — 80307 DRUG TEST PRSMV CHEM ANLYZR: CPT

## 2019-11-05 PROCEDURE — 80320 DRUG SCREEN QUANTALCOHOLS: CPT

## 2019-11-05 PROCEDURE — H2035 A/D TX PROGRAM, PER HOUR: HCPCS | Mod: HQ

## 2019-11-08 NOTE — ADDENDUM NOTE
Encounter addended by: Ofelia Beatty LADC on: 11/8/2019 10:15 AM   Actions taken: Pend clinical note

## 2019-11-08 NOTE — ADDENDUM NOTE
[facility] on: 11/8/2019 9:02 AM   Actions taken: Pend clinical note

## 2019-11-11 ENCOUNTER — HOSPITAL ENCOUNTER (OUTPATIENT)
Dept: BEHAVIORAL HEALTH | Facility: CLINIC | Age: 24
End: 2019-11-11
Attending: SOCIAL WORKER
Payer: COMMERCIAL

## 2019-11-11 PROCEDURE — H2035 A/D TX PROGRAM, PER HOUR: HCPCS | Mod: HQ

## 2019-11-11 PROCEDURE — H2035 A/D TX PROGRAM, PER HOUR: HCPCS

## 2019-11-11 NOTE — PROGRESS NOTES
"  Acknowledgement of Current Treatment Plan - Additional Entries     ADDITIONAL GOALS AND INTERVENTIONS:    Signatures/dates required for any additional Problems, Goals, and/or Interventions added to treatment plan:    Change/Addition in Dimension 4 on date: 11/11/2019    Insert here:   Date Assigned Source Area of Treatment Focus / Goal / Treatment Strategies Target  Date Initials Outcome Date Completed      11/11/2019 Self -  Current  Area of Treatment Focus:   Gonzalez reports wanting to increase his self confidence     Goal:  Gonzalez will identify personal problems areas and strategies to improve confidence in those areas     Treatment Strategies:  (Note: Frequency of all strategies are once unless specifically noted.)     Client will complete \"In a world of Unlimited Confidence\" questions and share with group       Client will complete \"As I develop genuine self confidence\" questions and share with group     Client will complete \"Personal [place] Statement\" assignment and share with group                                                                          11/26/19 12/17/19 12/31/19                                                              CN                CN            CN                                                              I have participated in creating this change and/or addition to my treatment plan copied above. I have been given a copy of this signature page with change/addition to my treatment plan and I am in agreement with how it is written in the electronic record.       Gonzalez Danny   ________________________ 11/11/2019  Client Name    Signature    Date      Last use date if changed: No Change      DARREL Amaral  "

## 2019-11-11 NOTE — PROGRESS NOTES
INDIVIDUAL SESSION NOTE  11/11/19    Data: Client and counselor discussed the new goals for Phase 2 including developing increased confidence. Client shared some of the guilt he was feeling surrounding not having his son full time and having his parents and brother having to provide transportation so he can see his son. Client also shared the history surrounding his son and his son's mother.      Intervention: Counselor challenged client to see all of the amazing things he is doing for his son right now including providing clothing and support so he has what he needs. He also takes his son every chance he gets. Counselor challenged him to think about how good he feels when he can help others so maybe he is providing those good feelings to those he allows to help him.    Assessment: Client was willing and open to challenging his negative thoughts and excepted assignment on exploring confidence       Plan: Client will begin writing assignment and continue to challenge feelings of guilt and inadequacy when they pop up

## 2019-11-11 NOTE — PROGRESS NOTES
MH Skills Group Progress Note   Name: Gonzalez Delgado  MR#: [id number]     Monday    Group Date: 10/14/2019   Group Attendance Attended group session   Group Therapy Type Psychoeducation and Psychotherapeutic   Group Topic Covered Family disease   Client's Response To Group Topic Cooperative with task Listened actively   Client Group Participation Detail Highly involved   Group Attendance (Time) 3.0 Hours   Individual Attendance 1 Hour   Family Attendance None   Other Comments/Information None      Total # of Mental Health Group Sessions: 3  Total # of Mental Health Individual Sessions: 5    DIMENSION 3:  Emotional/Behavioral/Cognitive Conditions and Complications  The degree to which any condition or complications are likely to interfere with treatment for substance abuse or with function in significant life areas and the likelihood of risk of harm to self or others.     Emotional/Behavioral - Current Risk Factor:  1    DSM-5 Diagnoses: Substance induced anxiety disorder.    Goal(s):   Client will verbalize a decrease in anxiety symptoms now that he is sober.  Client will verbalize the many aspects of how their mental health and chemical health impact each other.      Client reports taking the following medications; None     No current outpatient medications on file.     No current facility-administered medications for this encounter.      Facility-Administered Medications Ordered in Other Encounters   Medication     Self Administer Medications: Behavioral Services       Latest PHQ-9: Client scored 0 and indicated the symptoms are; not difficult at all to their daily living.  Latest FRANK-7: Client scored 0 and indicated the symptoms are; not difficult at all to their daily living.    Data: Client presents with a diagnosis of Substance induced anxiety disorder.  Client completed their most recent DA on 8/27/2019. Client rates his anxiety symptoms at a 5.5 on a scale of 1 - 10 with 10 being the highest. Client described  "his anxiety symptoms as \"getting rides to and from [facility]\". He talked about how he got this scheduled for the early part of the new year and has a difficult time asking for help to get rides situated for each day. I talked with him about this maybe is good practice for him reach out and suggested various family members, AA members ect. I talked about how asking many people lessens the burden on one person. I also talked about how he may need to change is schedule for the convenience of his rides instead of what works for him.  Client's protective factors include; son, group, and work. Client's potential risk factors include; being around others who are using, low self esteem, and lack of follow through. Client denies any suicidal thought, plan or intent this week. Client also denies any thoughts or behaviors of self harm this week.      Intervention:   Monday we talked about the power of our thoughts. We talked about the adversity we face in life and how our thinking impacts our emotions and actions. We talked about leadership, commitment and team work when working towards goals. We talked about how positive thoughts and beliefs help us decrease negative feelings and symptoms. We talked about the power of perseverance. Client was an active participant in group discussion and shared how in the past when they have engaged in negative thinking it perpetuated negative feelings and actions.       Assessment:    Client is currently in the contemplative stage of change. We are working on increasing client's awareness of their addiction by having client take a closer look at how his thoughts impact his feelings and behaviors.     Client seems motivated and engaged during group however continues to struggle with follow through.       Plan:   Client will continue with individual therapy as recommended throughout treatment.   Client will continue to discuss some follow up questions on this topic next week in group. Client will " focus, this week, on their thoughts being positive or negative and how this impacts their feelings and behaviors and overall motivation.       Lizzy Tenorio MA, LADC, LPCC  Licensed Psychotherapist

## 2019-11-11 NOTE — PROGRESS NOTES
"  Acknowledgement of Current Treatment Plan - Additional Entries     ADDITIONAL GOALS AND INTERVENTIONS:    Signatures/dates required for any additional Problems, Goals, and/or Interventions added to treatment plan:    Change/Addition in Dimension 4 on date: 11/11/2019    Insert here:   Date Assigned Source Area of Treatment Focus / Goal / Treatment Strategies Target  Date Initials Outcome Date Completed      11/11/2019 Self -  Current  Area of Treatment Focus:   Gonzalez reports wanting to increase his self confidence     Goal:  Gonzalez will identify personal problems areas and strategies to improve confidence in those areas     Treatment Strategies:  (Note: Frequency of all strategies are once unless specifically noted.)     Client will complete \"In a world of Unlimited Confidence\" questions and share with group       Client will complete \"As I develop genuine self confidence\" questions and share with group     Client will complete \"Personal [place] Statement\" assignment and share with group                                                                          11/26/19 12/17/19 12/31/19                                                              CN                CN            CN                                                              I have participated in creating this change and/or addition to my treatment plan copied above. I have been given a copy of this signature page with change/addition to my treatment plan and I am in agreement with how it is written in the electronic record.       Gonzalez Danny   ________________________ 11/11/2019  Client Name    Signature    Date      Last use date if changed: No Change      DARREL Amaral  "

## 2019-11-12 ENCOUNTER — HOSPITAL ENCOUNTER (OUTPATIENT)
Dept: BEHAVIORAL HEALTH | Facility: CLINIC | Age: 24
End: 2019-11-12
Attending: SOCIAL WORKER
Payer: COMMERCIAL

## 2019-11-12 PROCEDURE — H2035 A/D TX PROGRAM, PER HOUR: HCPCS | Mod: HQ

## 2019-11-12 NOTE — PROGRESS NOTES
Gonzalez Delgado  [id number]               Adult CD Progress Note and Treatment Plan Review     Attendance       Tuesday     Group Date: 11/12/2019   Group Attendance Attended group session   Group Therapy Type Psychoeducation   Group Topic Covered DBT Activity   Client's Response To Group Topic Cooperative with task Listened actively   Client Group Participation Detail Shows interest and Adequate participation   Group Attendance (Time) 3.0 Hours   Individual Attendance 31 Minutes   Family Attendance None   Other Comments/Information None         Total # of Phase 1 Group Sessions: 24    Total # of Phase 2 Group Sessions: 3  Total # of Phase 3 Group Sessions: N/A  Total # of 1:1 Sessions: 3    Support group attended this week: no    Reporting sobriety: Yes    Treatment Plan Review     Treatment Plan Review completed on:  11/12/2019    Projected discharge date: 1/6/2020    Client preferred learning style: Visual  Hands on  Verbal  Demonstration    Staff member(s) contributing: Ofelia SWEENEY, [facility]    Received supervision: No    Client involvement with treatment planning: contributed to goals and plan.    Client received copy of plan/revised plan: Yes    Client agrees with plan/revised plan: Yes    Changes to Treatment Plan: Yes    Client's Dimension 1 Goal(s) 1. Develop effective strategies to maintain abstinence.   Goal not addressed this week.   Client's Dimension 2 Goal(s) 1. Maintain good physical health. Goal not addressed this week.   Client's Dimension 3 Goal(s) 1. Gonzalez will continuing building and implementing healthy coping strategies to manage emotions Client presented assignment.   Client's Dimension 4 Goal(s) 1. Client will demonstrate healthy recovery skills daily.   2. Gonzalez will identify personal problems areas and strategies to improve confidence in those areas See below.   Client's Dimension 5 Goal(s) 1. Client will develop recovery care plan while in Phase 3  Goal not addressed this week.  "  Client's Dimension 6 Goal(s) 1. Client will continue to build sober network an engage in sober activities with other sober individuals   2. Client will remain compliant with probation See below.     New Goals added since last review: None    Goal(s) worked on since last review:   Client will demonstrate healthy recovery skills daily  Gonzalez will identify personal problems areas and strategies to improve confidence in those areas  Client will continue to build sober network an engage in sober activities with other sober individuals   Gonzalez will continuing building and implementing healthy coping strategies to manage emotions    Strategies effective: Yes    Treatment Coordination Activities: None.    Medical, Mental Health and other appointments the client attended: None    Medication issues: None.    Physical and mental health problems: None.    Review and evaluation of the individual abuse prevention plan: The program's abuse prevention plan (VERNA) is sufficient for this client.     Substance Use Disorders:    303.90 (F10.20) Alcohol Use Disorder Severe  304.30 (F12.20) Cannabis Use Disorder Severe  305.10 (F17.200) Tobacco Use Disorder Severe    ASAM Risk Rating: (Note the rationale for risk rating changes)    Dimension 1 0 No change    Dimension 2 0 No change    Dimension 3 1 No change    Dimension 4 1 No change    Dimension 5 3 No change      Dimension 6 2 No change    Data:   Client submitted UA on 11/5/19. Results were negative for illicit substances. Client reports no cravings or triggers this week. Client reports sleeping approximately 6 hours per night and rates his overall health as a 8 out of 10, with 10 being the best. Client reports his  motivation for recovery continues to be at an 8 out of 10 and identifies his son and \"spiritual clarity\" as being his current motivators. Client reports completing his assignment on giving up control and relying on a higher power. Client reports he is practicing " "\"mindfulness\" and staying in the present as strategies to manage anxiety. Client reports he is thankful for the relationship he has with his brother, his son, and God. Client reports he is proud of \"being a father to my boy and rebuilding a relationship with my Mom\" Client reports he did not attend a support group this week but he is planning to attend again.       Intervention:     Tuesday - Client participated in creating a \"DBT House\". This art activity helps clients to recognize their strengths, ways of coping, support system, values, and so forth. It also shows weak areas which may require change or growth. Client identified feelings of low self worth as an area he wants to heal    Assessment:    Stages of Change Model  Preparation/Determination    Client shares openly. Client presents as honest and discusses what is going on for him; both struggles and successes.     Plan:   Client will attend the same support group meeting he attended in the past and report back to group next week    Ofelia SWEENEY, LADC  "

## 2019-11-14 NOTE — ADDENDUM NOTE
Encounter addended by: Ofelia Beatty [facility] on: 11/14/2019 11:17 AM   Actions taken: Pend clinical note, Clinical Note Signed

## 2019-11-18 ENCOUNTER — HOSPITAL ENCOUNTER (OUTPATIENT)
Dept: BEHAVIORAL HEALTH | Facility: CLINIC | Age: 24
End: 2019-11-18
Attending: SOCIAL WORKER
Payer: COMMERCIAL

## 2019-11-18 PROCEDURE — H2035 A/D TX PROGRAM, PER HOUR: HCPCS | Mod: HQ

## 2019-11-18 NOTE — PROGRESS NOTES
MH Skills Group Progress Note   Name: Gonzalez Delgado  MR#: [id number]     Monday    Group Date: 11/18/2019   Group Attendance Attended group session   Group Therapy Type Psychoeducation and Psychotherapeutic   Group Topic Covered 5 Lessons from the Death Crawl Scene   Client's Response To Group Topic Cooperative with task Listened actively   Client Group Participation Detail Highly involved   Group Attendance (Time) 3.0 Hours   Individual Attendance None   Family Attendance None   Other Comments/Information None      Total # of Mental Health Group Sessions: 5  Total # of Mental Health Individual Sessions: 5    DIMENSION 3:  Emotional/Behavioral/Cognitive Conditions and Complications  The degree to which any condition or complications are likely to interfere with treatment for substance abuse or with function in significant life areas and the likelihood of risk of harm to self or others.     Emotional/Behavioral - Current Risk Factor:  1    DSM-5 Diagnoses: Substance induced anxiety disorder.    Goal(s):   Client will verbalize a decrease in anxiety symptoms now that he is sober.  Client will verbalize the many aspects of how their mental health and chemical health impact each other.      Client reports taking the following medications; None     No current outpatient medications on file.     No current facility-administered medications for this encounter.      Facility-Administered Medications Ordered in Other Encounters   Medication     Self Administer Medications: Behavioral Services       Latest PHQ-9: Client scored 0 and indicated the symptoms are; not difficult at all to their daily living.  Latest FRANK-7: Client scored 0 and indicated the symptoms are; not difficult at all to their daily living.    Data: Client presents with a diagnosis of Substance induced anxiety disorder.  Client completed their most recent DA on 8/27/2019. Client denied any mental health symptoms this week.  Client's protective factors  include; son, group, and work. Client's potential risk factors include; being around others who are using, low self esteem, and lack of follow through. Client denies any suicidal thought, plan or intent this week. Client also denies any thoughts or behaviors of self harm this week.      Intervention:   This week we talked about 5 lessons from the death crawl scene in a video clip. First, not giving up before the time is up. We talked about how sometimes when things get hard it s easy to tell ourselves that giving up is okay. We talked about the need to persevere and work through to the end as we just never know how that last little bit might change things for us. Second, we discussed not giving up even after we reach our goals. We talked about how we sometimes cut ourselves short by not continuing to try after goals are accomplished. We might be missing an opportunity for greatness by stopping only after the goal instead of challenging ourselves to see how much we can accomplish. Third, we addressed putting forth out  absolute very best . We talked about the  good, better, best  scale and client was asked to admit if they are giving their  absolute very best  in this program. Fourth, we talked about how when we perform at our best we inspire others. We talked about this being a program of attraction not promotion and what that means. We talked about true leadership skills come from leading by behavioral examples not dictating directions. Finally, we talked about the idea of refusing to walk around defeated. We talked about how we continue to promote positive solution focused ideas to ourselves and others. We talked about leading with the power of positivity. Client was active in discussion and share their personal experience and views on these 5 lessons. Client shared actively about how they could relate personally and some of their shortcomings and ways they could work towards doing their  absolute very best .  Client was then asked to create a picture of what motivates them to do their absolute very best and another picture on what blocks their motivation to do their absolute very best and share in Tuesday/Wednesday group session.     Assessment:    Client is currently in the contemplative stage of change. We are working on increasing client's awareness of their addiction by talking about the power of not giving up, the challenges we face and increasing coping strategies to engage in follow through behaviors.     Client seems motivated and engaged during group however continues to struggle with follow through.       Plan:   Client will continue with individual therapy as recommended throughout treatment.   Client will share their pictures and process these pictures with the group later this week.       Lizzy Tenorio MA, LADC, LPCC  Licensed Psychotherapist

## 2019-11-19 ENCOUNTER — HOSPITAL ENCOUNTER (OUTPATIENT)
Dept: BEHAVIORAL HEALTH | Facility: CLINIC | Age: 24
End: 2019-11-19
Attending: SOCIAL WORKER
Payer: COMMERCIAL

## 2019-11-19 PROCEDURE — H2035 A/D TX PROGRAM, PER HOUR: HCPCS | Mod: HQ

## 2019-11-19 NOTE — PROGRESS NOTES
Gonzalez Delgado  [id number]               Adult CD Progress Note and Treatment Plan Review     Attendance       Tuesday     Group Date: 11/19/2019   Group Attendance Attended group session   Group Therapy Type Psychotherapeutic   Group Topic Covered Disease of Addiction/Choices in Recovery   Client's Response To Group Topic Cooperative with task Listened actively   Client Group Participation Detail Shows interest and Adequate participation   Group Attendance (Time) 3.0 Hours   Individual Attendance 31 Minutes   Family Attendance None   Other Comments/Information None         Total # of Phase 1 Group Sessions: 24    Total # of Phase 2 Group Sessions: 4  Total # of Phase 3 Group Sessions: N/A  Total # of 1:1 Sessions: 3    Support group attended this week: no    Reporting sobriety: Yes    Treatment Plan Review     Treatment Plan Review completed on:  11/19/2019    Projected discharge date: 1/6/2020    Client preferred learning style: Visual  Hands on  Verbal  Demonstration    Staff member(s) contributing: Ofelia SWEENEY, LADC  Received supervision: Client's case was reviewed this week with treatment team which included; LELIA Gracia, ILDA, LADC, Lizzy Tenorio MA, LADC, [facility], LILIANA Dunn and ZAHRA Melendez, LILIANAC. Client's treatment goals and progress towards these goals were reviewed and it was determined no changes were needed this week.    Client involvement with treatment planning: contributed to goals and plan.    Client received copy of plan/revised plan: Yes    Client agrees with plan/revised plan: Yes    Changes to Treatment Plan: Yes    Client's Dimension 1 Goal(s) 1. Develop effective strategies to maintain abstinence.   See below.   Client's Dimension 2 Goal(s) 1. Maintain good physical health. See below.   Client's Dimension 3 Goal(s) 1. Gonzalez bobby continuing building and implementing healthy coping strategies to manage emotions See mental health note   Client's Dimension 4  "Goal(s) 1. Client will demonstrate healthy recovery skills daily.   2. Gonzalez will identify personal problems areas and strategies to improve confidence in those areas See below.   Client's Dimension 5 Goal(s) 1. Client will develop recovery care plan while in Phase 3  Goal not addressed this week.   Client's Dimension 6 Goal(s) 1. Client will continue to build sober network an engage in sober activities with other sober individuals   2. Client will remain compliant with probation See below.     New Goals added since last review: None    Goal(s) worked on since last review:   Client will demonstrate healthy recovery skills daily  Client will continue to build sober network an engage in sober activities with other sober individuals   Maintain good physical health  Develop effective strategies to maintain abstinence    Strategies effective: Yes    Treatment Coordination Activities: None.    Medical, Mental Health and other appointments the client attended: Individual session with Lizzy Tenorio MA, LPCC, LADC    Medication issues: None.    Physical and mental health problems: None.    Review and evaluation of the individual abuse prevention plan: The program's abuse prevention plan (VERNA) is sufficient for this client.     Substance Use Disorders:    303.90 (F10.20) Alcohol Use Disorder Severe  304.30 (F12.20) Cannabis Use Disorder Severe  305.10 (F17.200) Tobacco Use Disorder Severe    ASAM Risk Rating: (Note the rationale for risk rating changes)    Dimension 1 0 No change    Dimension 2 0 No change    Dimension 3 1 No change    Dimension 4 1 No change    Dimension 5 3 No change      Dimension 6 2 No change    Data:   Client submitted UA on 11/5/19. Results were negative for illicit substance. Client reports feeling triggered this last week when a co worker asked him to \"come over for drinks\". Client declined the offer and feels proud of himself for doing this. Client doesn't endorse any symptoms of anxiety this " "week and rates his motivation for continued recovery at a 9 out of 10. Client reports practicing mindfulness and reaching out to family as coping strategies he implemented this week. Client rates his physical health at an 8 out of 10 and reports playing basketball as a physical activity he engaged in this week. Client is thankful for his relationship with his family. Client reports not making time for a meeting this week and we discussed the importance of 'making time\". Client agrees this should be a priority. Client is compliant with probation at this time.    Intervention:     Tuesday - Counselor and outside speaker spoke with client about the benefits of attending outside meetings, including attending different meetings to find a good fit and obtaining a sponsor. Speaker shared some of his personal story and answered questions related to his addiction and recovery.    Assessment:    Stages of Change Model  Preparation/Determination    Client shares openly. Client presents as honest and discusses what is going on for him; both struggles and successes.     Plan:   Client will continue attending the same support group meeting he attended in the past and report back to group next week    Ofelia Beatty BS, LADC  "

## 2019-11-20 ENCOUNTER — HOSPITAL ENCOUNTER (OUTPATIENT)
Dept: BEHAVIORAL HEALTH | Facility: CLINIC | Age: 24
End: 2019-11-20
Attending: SOCIAL WORKER
Payer: COMMERCIAL

## 2019-11-20 NOTE — PROGRESS NOTES
"INDIVIDUAL THERAPY NOTE    Data: We started by reviewing his treatment work. He shared two assignments \"bubbles\" and \"I'm someone who...\".       Intervention: I challenged client on his insight in to these assignments and what they mean to him. He answered the questions however did not elaborate or go deeper then surface level. I talked with him and probed a bit further in to these assignments. We talked about confidence, self esteem and his need to believe in these qualities in himself more and more consistent. We talked about how his low confidence and low self esteem continues to impact his follow through on behavioral changes. I challenged him on these being large growth areas for him. I talked with him about my concerns of his stagnant engagement and seemingly treating group like a social hour. He agreed that he has been doing that because he doesn't have much outside of treatment. I talked with him about the importance of expanding his support network and getting more involved in support meetings or community events as a way of meeting healthy people. I talked with him about emotional connection being important for him to practice and show true leadership in this group. We talked about my goals for him moving forward as he decreasing in levels of care; phases.       Assessment: Client appears willing to hear feedback and willing to use his self awareness to see how the feedback can apply. The growth area is his follow through with behavioral change to support his life goals.       Plan: Client will work on connecting and sharing his emotions more during group. He will also attend one support meeting a week and start looking for a sponsor.   " Last visit: 8/23/17

## 2019-11-21 NOTE — ADDENDUM NOTE
[facility] on: 11/21/2019 10:36 AM   Actions taken: Clinical Note Signed

## 2019-11-22 NOTE — ADDENDUM NOTE
Encounter addended by: Kenya Callahan LADC on: 11/21/2019 6:17 PM   Actions taken: Clinical Note Signed

## 2019-11-22 NOTE — PROGRESS NOTES
Case consultation:  D)  Therapist reviewed ct's case.  DOC alcohol/marijuana.  Substance Induced Anxiety.  Ct enjoys social aspects of group.  As of late, ct is not following through with tx assignments.  P)  Use motivational interviewing to help ct reengage.  Adjust tx plan as needed.    LELIA Gracia, LICSW, [facility]  Clinical

## 2019-11-25 ENCOUNTER — HOSPITAL ENCOUNTER (OUTPATIENT)
Dept: BEHAVIORAL HEALTH | Facility: CLINIC | Age: 24
End: 2019-11-25
Attending: SOCIAL WORKER
Payer: COMMERCIAL

## 2019-11-25 PROCEDURE — H2035 A/D TX PROGRAM, PER HOUR: HCPCS | Mod: HQ

## 2019-11-25 PROCEDURE — 80307 DRUG TEST PRSMV CHEM ANLYZR: CPT

## 2019-11-25 PROCEDURE — 80320 DRUG SCREEN QUANTALCOHOLS: CPT

## 2019-11-25 NOTE — PROGRESS NOTES
MH Skills Group Progress Note   Name: Gonzalez Delgado  MR#: [id number]     Monday    Group Date: 11/25/2019   Group Attendance Attended group session   Group Therapy Type Psychoeducation and Psychotherapeutic   Group Topic Covered Mindfulness   Client's Response To Group Topic Cooperative with task Listened actively   Client Group Participation Detail Highly involved   Group Attendance (Time) 3.0 Hours   Individual Attendance None   Family Attendance None   Other Comments/Information None      Total # of Mental Health Group Sessions: 6  Total # of Mental Health Individual Sessions: 6    DIMENSION 3:  Emotional/Behavioral/Cognitive Conditions and Complications  The degree to which any condition or complications are likely to interfere with treatment for substance abuse or with function in significant life areas and the likelihood of risk of harm to self or others.     Emotional/Behavioral - Current Risk Factor:  1    DSM-5 Diagnoses: Substance induced anxiety disorder.    Goal(s):   Client will verbalize a decrease in anxiety symptoms now that he is sober.  Client will verbalize the many aspects of how their mental health and chemical health impact each other.      Client reports taking the following medications; None     No current outpatient medications on file.     No current facility-administered medications for this encounter.      Facility-Administered Medications Ordered in Other Encounters   Medication     Self Administer Medications: Behavioral Services       Latest PHQ-9: Client scored 0 and indicated the symptoms are; not difficult at all to their daily living.  Latest FRANK-7: Client scored 0 and indicated the symptoms are; not difficult at all to their daily living.    Data: Client presents with a diagnosis of Substance induced anxiety disorder.  Client completed their most recent DA on 8/27/2019. Client denied any mental health symptoms this week.  Client's protective factors include; son, group, and work.  Client's potential risk factors include; being around others who are using, low self esteem, and lack of follow through. Client denies any suicidal thought, plan or intent this week. Client also denies any thoughts or behaviors of self harm this week.      Intervention:   I confronted client about hs negative flare lately in treatment and his need to engage in change behaviors. I talked with him about his surface level engagement and his tendency to treat group like a social hour and not discuss his emotions. Client was receptive to this and agreed he needs to engage at a higher level. This week we talked about DBT Mindfulness.  We discussed the goals and definitions of mindfulness. We discussed the states of mind; reasonable, emotional and wise. Client was asked to talk about how they could relate to each of the states of mind in their life. They were also asked to discuss how having a jiang mind would benefit them in their day to day life. We then did three different mindfulness experiential activities. Client verbalized benefits of the three activities and talked about how they could see themselves using these skills in different areas of their life.     Assessment:    Client is currently in the contemplative stage of change. We are working on increasing client's awareness of their addiction by talking about different skills they can use to decrease stress and provide relief and happiness to their life.     Client struggles with follow through despite being challenged around this area.       Plan:   Client will continue with individual therapy as recommended throughout treatment.   Client will continue to practice these mindfulness techniques and report back to group their experience. We will continue to discuss  what skills  and  how skills  in subsequent groups.        Lizzy Tenorio MA, [facility], [facility]  Licensed Psychotherapist

## 2019-11-26 ENCOUNTER — HOSPITAL ENCOUNTER (OUTPATIENT)
Dept: BEHAVIORAL HEALTH | Facility: CLINIC | Age: 24
End: 2019-11-26
Attending: SOCIAL WORKER
Payer: COMMERCIAL

## 2019-11-26 PROCEDURE — H2035 A/D TX PROGRAM, PER HOUR: HCPCS | Mod: HQ

## 2019-11-26 NOTE — PROGRESS NOTES
Gonzalez Delgado  [id number]               Adult CD Progress Note and Treatment Plan Review     Attendance       Tuesday     Group Date: 11/26/2019   Group Attendance Attended group session   Group Therapy Type Psychotherapeutic   Group Topic Covered Stress Management   Client's Response To Group Topic Cooperative with task Listened actively   Client Group Participation Detail Shows interest and Adequate participation   Group Attendance (Time) 3.0 Hours   Individual Attendance None   Family Attendance None   Other Comments/Information None         Total # of Phase 1 Group Sessions: 24   Total # of Phase 2 Group Sessions: 5  Total # of Phase 3 Group Sessions: N/A  Total # of 1:1 Sessions: 3    Support group attended this week: yes    Reporting sobriety: Yes    Treatment Plan Review     Treatment Plan Review completed on:  11/26/2019    Projected discharge date: 1/6/2020    Client preferred learning style: Visual  Hands on  Verbal  Demonstration    Staff member(s) contributing: Ofelia SWEENEY, [facility]  Received supervision: No    Client involvement with treatment planning: contributed to goals and plan.    Client received copy of plan/revised plan: Yes    Client agrees with plan/revised plan: Yes    Changes to Treatment Plan: Yes    Client's Dimension 1 Goal(s) 1. Develop effective strategies to maintain abstinence.   See below.   Client's Dimension 2 Goal(s) 1. Maintain good physical health. See below.   Client's Dimension 3 Goal(s) 1. Gonzalez will continuing building and implementing healthy coping strategies to manage emotions See mental health note   Client's Dimension 4 Goal(s) 1. Client will demonstrate healthy recovery skills daily.   2. Gonzalez will identify personal problems areas and strategies to improve confidence in those areas See below.   Client's Dimension 5 Goal(s) 1. Client will develop recovery care plan while in Phase 3  Goal not addressed this week.   Client's Dimension 6 Goal(s) 1. Client will  "continue to build sober network an engage in sober activities with other sober individuals   2. Client will remain compliant with probation See below.     New Goals added since last review: None    Goal(s) worked on since last review:   Client will demonstrate healthy recovery skills daily  Client will continue to build sober network an engage in sober activities with other sober individuals   Maintain good physical health  Develop effective strategies to maintain abstinence    Strategies effective: Yes    Treatment Coordination Activities: None.    Medical, Mental Health and other appointments the client attended: None    Medication issues: None.    Physical and mental health problems: None.    Review and evaluation of the individual abuse prevention plan: The program's abuse prevention plan (VERNA) is sufficient for this client.     Substance Use Disorders:    303.90 (F10.20) Alcohol Use Disorder Severe  304.30 (F12.20) Cannabis Use Disorder Severe  305.10 (F17.200) Tobacco Use Disorder Severe    ASAM Risk Rating: (Note the rationale for risk rating changes)    Dimension 1 0 No change    Dimension 2 0 No change    Dimension 3 1 No change    Dimension 4 1 No change    Dimension 5 3 No change      Dimension 6 2 No change    Data:   Client submitted UA on 11/25/19. Results were negative for illicit substance. Client doesn't endorse any symptoms of anxiety this week and rates his motivation for continued recovery at a 9 out of 10. Client reports practicing mindfulness and reaching out to family as coping strategies he implemented this week. Client rates his physical health at an 8 out of 10 and reports playing basketball as a physical activity he engaged in this week. Client is thankful for his relationship with his family and looking forward to Thanksgiving dinner with his family and extended family. Client reports he attended a support group and it went \"pretty good\". Client is willing to go again this week and was " encouraged to talk with at least one person. Client is compliant with probation at this time.    Intervention:     Tuesday - Client and counselor discussed the upcoming holidays and the stress this can create for individuals in early recovery. Client was provided helpful tips to avoid getting caught in high risk situations or make an exit from uncomfortable situations. Client was shown and then participated in diaphragmatic breathing techniques to help rapidly decrease anxiety and stress    Assessment:    Stages of Change Model  Preparation/Determination    Client shares openly. Client presents as honest and discusses what is going on for him; both struggles and successes.     Plan:   Client will attend the same support group meeting he attended in the past and attempt to start a conversation with another member. Client will report back to group next week    Ofelia SWEENEY, LADC

## 2019-11-26 NOTE — PROGRESS NOTES
"  Acknowledgement of Current Treatment Plan - Additional Entries     ADDITIONAL GOALS AND INTERVENTIONS:    Signatures/dates required for any additional Problems, Goals, and/or Interventions added to treatment plan:    Change/Addition in Dimensions 3 4 5 & 6 on date: 11/12/2019    Insert here:     Date Assigned Source Area of Treatment Focus / Goal / Treatment Strategies Target  Date Initials Outcome Date Completed     11/12/2019 Self -  Current  Area of Treatment Focus:   Regulation of intense emotions    Goal:  Gonzalez will continuing building and implementing healthy coping strategies to manage emotions    Treatment Strategies:  (Note: Frequency of all strategies are once unless specifically noted.)    Client will practice assertiveness at least 2 time per week and report experiences to group every Tuesday 1/28/2020                                 CN                                                                         Readiness to Change     DIMENSION 4  RISK FACTOR: 3             Date Assigned Source Area of Treatment Focus / Goal / Treatment Strategies Target  Date Initials Outcome Date Completed     Date Assigned Source Area of Treatment Focus / Goal / Treatment Strategies Target  Date Initials Outcome Date Completed      11/11/2019 Self -  Current  Area of Treatment Focus:   Gonzalez reports wanting to increase his self confidence     Goal:  Gonzalez will identify personal problems areas and strategies to improve confidence in those areas     Treatment Strategies:  (Note: Frequency of all strategies are once unless specifically noted.)     Client will complete \"In a world of Unlimited Confidence\" questions and share with group         Client will complete \"As I develop genuine self confidence\" questions and share with group      Client will complete \"Personal [place] Statement\" assignment and share with group                   "                                       11/26/19 12/17/19 12/31/19                                       LYNN BAILEY        CN                                                           Relapse/Continues Use/Continues Problem Potential     DIMENSION 5  RISK FACTOR: 3                 Date Assigned Source Area of Treatment Focus / Goal / Treatment Strategies Target  Date Initials Outcome Date Completed   8/21/2019 12/9/19   Self -  Current  Area of Treatment Focus:  Client would like to expand his coping strategies to minimize the risk of relapse.     Goal:  Client will demonstrate three new coping strategies Changed To:   Client will develop recovery care plan while in Phase 3     Treatment Strategies:  (Note: Must indicate the amount and frequency for each strategy)    Client will complete (1x)  the Recovery Care Plan while in Phase 3 and share key points with the group.     Client will use the worksheets provided during the DBT mindfulness group and practice one new skill each day for 2 weeks and report back to group which skills were effective.                              1/27/2020      10/21/2019                             LYNN        KA                                     Completed                                     10/14/2019     Recovery Environment     DIMENSION 6  RISK FACTOR: 2                 Date Assigned Source Area of Treatment Focus / Goal / Treatment Strategies Target  Date Initials Outcome Date Completed   8/21/2019 Self -  Current  Area of Treatment Focus:  Client reports he isolates when he is at risk for using.     Goal:  Client will expand his sober support network by three people. Changed To:   Client will continue to build sober network an engage in sober activities with other sober individuals   Treatment Strategies:  (Note: Must indicate the amount and frequency for each strategy)    Client will attend one support meeting this week and report back  my experience to the group. (1x)    Client will attend a support meeting this week and obtain the names and numbers of three sober people. (1x)    Client will utilize one of the names and numbers she obtained at last weeks meeting and schedule a sober outing with the person. (1x)                                 9/18/2019        10/9/2019        10/16/2019                               LUCIO VALDES                               Completed        Completed                               10/11/2019        11/12/19     Date Assigned Source Area of Treatment Focus / Goal / Treatment Strategies Target  Date Initials Outcome Date Completed   8/21/2019 Self -  Current  Area of Treatment Focus:  Client has legal consequences due to chemical use.    Goal:  Client will resolve all legal issues. Changed To: Client will remain compliant with probation    Treatment Strategies:  (Note: Must indicate the amount and frequency for each strategy)    Client will follow all legal recommendations and requirements. (Ongoing throughout treatment)                         1/23/2020                         KA                                         I have participated in creating this change and/or addition to my treatment plan copied above. I have been given a copy of this signature page with change/addition to my treatment plan and I am in agreement with how it is written in the electronic record.       Gonzalez Delgado   ________________________ 11/26/2019  Client Name    Signature    Date      Last use date if changed: No Change      DARREL Amaral

## 2019-12-02 ENCOUNTER — HOSPITAL ENCOUNTER (OUTPATIENT)
Dept: BEHAVIORAL HEALTH | Facility: CLINIC | Age: 24
End: 2019-12-02
Attending: SOCIAL WORKER
Payer: COMMERCIAL

## 2019-12-02 PROCEDURE — H2035 A/D TX PROGRAM, PER HOUR: HCPCS | Mod: HQ

## 2019-12-02 NOTE — PROGRESS NOTES
MH Skills Group Progress Note   Name: Gonzalez Delgado  MR#: [id number]     Monday    Group Date: 12/2/2019   Group Attendance Attended group session   Group Therapy Type Psychoeducation and Psychotherapeutic   Group Topic Covered Mindfulness   Client's Response To Group Topic Cooperative with task Listened actively   Client Group Participation Detail Highly involved   Group Attendance (Time) 3.0 Hours   Individual Attendance None   Family Attendance None   Other Comments/Information None      Total # of Mental Health Group Sessions: 7  Total # of Mental Health Individual Sessions: 6    DIMENSION 3:  Emotional/Behavioral/Cognitive Conditions and Complications  The degree to which any condition or complications are likely to interfere with treatment for substance abuse or with function in significant life areas and the likelihood of risk of harm to self or others.     Emotional/Behavioral - Current Risk Factor:  1    DSM-5 Diagnoses: Substance induced anxiety disorder.    Goal(s):   Client will verbalize a decrease in anxiety symptoms now that he is sober.  Client will verbalize the many aspects of how their mental health and chemical health impact each other.      Client reports taking the following medications; None     No current outpatient medications on file.     No current facility-administered medications for this encounter.      Facility-Administered Medications Ordered in Other Encounters   Medication     Self Administer Medications: Behavioral Services       Latest PHQ-9: Client scored 0 and indicated the symptoms are; not difficult at all to their daily living.  Latest FRANK-7: Client scored 0 and indicated the symptoms are; not difficult at all to their daily living.    Data: Client presents with a diagnosis of Substance induced anxiety disorder.  Client completed their most recent DA on 8/27/2019. Client rated their anxiety at a 4 on a scale of 1 - 10 with 10 being the highest. He states that he is anxious  "about \"completing treatment\".  Client's protective factors include; son, group, and work. Client's potential risk factors include; being around others who are using, low self esteem, and lack of follow through. Client denies any suicidal thought, plan or intent this week. Client also denies any thoughts or behaviors of self harm this week.      Intervention:   This week we talked about DBT Mindfulness.  We discussed  what skills . We reviewed observing, describing and participating and what each of these means and how they tie in to mindfulness practice overall. Client was then asked to break in to pairs and come up with their own way of practicing the skills listed on the handout outlining observing, describing and practicing. Client s group will share their ideas next week in group.    Assessment:    Client is currently in the contemplative stage of change. We are working on increasing client's awareness of their addiction by talking about ways he can incorporate mindfulness skills to decrease stress and worry.    Client continues struggles with follow through despite being challenged around this area.       Plan:   Client will continue with individual therapy as recommended throughout treatment.   Client will report back to group next week the ideas they came up with this week in their small groups/partners.      Lizzy Tenorio MA, LADC, LPCC  Licensed Psychotherapist  "

## 2019-12-03 ENCOUNTER — HOSPITAL ENCOUNTER (OUTPATIENT)
Dept: BEHAVIORAL HEALTH | Facility: CLINIC | Age: 24
End: 2019-12-03
Attending: SOCIAL WORKER
Payer: COMMERCIAL

## 2019-12-03 PROCEDURE — H2035 A/D TX PROGRAM, PER HOUR: HCPCS

## 2019-12-03 PROCEDURE — H2035 A/D TX PROGRAM, PER HOUR: HCPCS | Mod: HQ

## 2019-12-03 NOTE — PROGRESS NOTES
Gonzalez Delgado  [id number]               Adult CD Progress Note and Treatment Plan Review     Attendance       Tuesday     Group Date: 12/3/2019   Group Attendance Attended group session   Group Therapy Type Psychoeducation and Psychotherapeutic   Group Topic Covered Purpose and Self Awareness   Client's Response To Group Topic Cooperative with task Listened actively   Client Group Participation Detail Shows interest and Adequate participation   Group Attendance (Time) 3.0 Hours   Individual Attendance None   Family Attendance None   Other Comments/Information None         Total # of Phase 1 Group Sessions: 24   Total # of Phase 2 Group Sessions: 6  Total # of Phase 3 Group Sessions: N/A  Total # of 1:1 Sessions: 3    Support group attended this week: no    Reporting sobriety: Yes    Treatment Plan Review     Treatment Plan Review completed on:  12/3/2019    Projected discharge date: 1/14/2020    Client preferred learning style: Visual  Hands on  Verbal  Demonstration    Staff member(s) contributing: Ofelia SWEENEY, LADC    Received supervision: Client's case was reviewed this week with treatment team which included; LELIA Gracia, ILDA, LADC, DARREL Dunn, and ZAHRA Melendez, LILIANAC. Client's treatment goals and progress towards these goals were reviewed and it was determined no changes were needed this week. Client is preparing to transition to the last phase of treatment.    Client involvement with treatment planning: contributed to goals and plan.    Client received copy of plan/revised plan: Yes    Client agrees with plan/revised plan: Yes    Changes to Treatment Plan: Yes    Client's Dimension 1 Goal(s) 1. Develop effective strategies to maintain abstinence.   Goal not addressed this week.   Client's Dimension 2 Goal(s) 1. Maintain good physical health. Goal not addressed this week.   Client's Dimension 3 Goal(s) 1. Gonzalez bobby continuing building and implementing healthy coping  strategies to manage emotions See mental health note   Client's Dimension 4 Goal(s) 1. Client will demonstrate healthy recovery skills daily.   2. Gonzalez will identify personal problems areas and strategies to improve confidence in those areas See below.   Client's Dimension 5 Goal(s) 1. Client will develop recovery care plan while in Phase 3  Goal not addressed this week.   Client's Dimension 6 Goal(s) 1. Client will continue to build sober network an engage in sober activities with other sober individuals   2. Client will remain compliant with probation See below.     New Goals added since last review: None    Goal(s) worked on since last review:   Client will demonstrate healthy recovery skills daily  Client will continue to build sober network an engage in sober activities with other sober individuals     Strategies effective: Yes    Treatment Coordination Activities: None.    Medical, Mental Health and other appointments the client attended: [facility] Liz[facility] Tenorio [facility], DARREL[facility]    Medication issues: None.    Physical and mental health problems: None.    Review and evaluation of the individual abuse prevention plan: The program's abuse prevention plan (VERNA) is sufficient for this client.     Substance Use Disorders:    303.90 (F10.20) Alcohol Use Disorder Severe  304.30 (F12.20) Cannabis Use Disorder Severe  305.10 (F17.200) Tobacco Use Disorder Severe    ASAM Risk Rating: (Note the rationale for risk rating changes)    Dimension 1 0 No change    Dimension 2 0 No change    Dimension 3 1 No change    Dimension 4 1 No change    Dimension 5 3 No change      Dimension 6 2 No change    Data:   Client submitted UA on 11/25/19. Results were negative for illicit substance. Client doesn't endorse any symptoms of anxiety this week and rates his motivation for continued recovery at a 9 out of 10. Client reports practicing mindfulness and reaching out to family as coping strategies he implemented this  "week. Client rates his physical health at an 8 out of 10 and reports walking as a physical activity he engaged in this week. Client is thankful for his relationship with his family and felt \"really respected\" on Thanksgiving because there was no alcohol offered to people (Client reports his Dad usually drinks craft beers on Holidays and offers it to everyone) Client is preparing to transition to Phase 3 next week and feels like he is \"ready\" but will miss his peers    Intervention:     Tuesday - The client learned about the journey of being the hero in his life. This was done through exploring Cash Rodriguez's stages of The Hero's Journey. Once the client reviewed all of the following stages, the client was given the assignment to pick from a list of films and chart the journey of the main character.  1.Ordinary World: This step refers to the hero's normal life at the start of the story, before the adventure begins.  This is the life before the individual is confronted with his/her dependence on drugs/alcohol  2. Call to Adventure: The hero is faced with something that makes him begin his adventure. This might be a problem or a challenge he needs to overcome. This is the catalyst; perhaps a legal consequence, overdose, or hospitalization due to use  3. Refusal of the Call: The hero attempts to refuse the adventure because he is afraid.  Often seen as denial of the problem  4. Meeting with the [place]: The hero encounters someone who can give him advice and ready him for the journey ahead. The referral for services  5. Crossing the First Threshold: The hero leaves his ordinary world for the first time and crosses the threshold into adventure.  The individual enters treatment of some type  6. Tests, Allies, Enemies: The hero learns the rules of his new world. During this time, he endures tests of strength of will, meets friends, and comes face to face with foes.  The individual begins the process of learning, building " "support, and dealing with consequences  7. Approach: Setbacks occur, sometimes causing the hero to try a new approach or adopt new ideas.  Sometimes the individual slips or relapses  8. Ordeal: The hero experiences a major diane or obstacle, such as a life or death crisis.  Because of a relapse, the individual is often faced with legal or family ultimatums and begins to set things right and begin the journey of Recovery  9. Reward: After surviving death, the hero earns his reward or accomplishes his goal.  Recovery brings many rewards  10. The Road Back: The hero begins his journey back to his ordinary life.  The individual returns to his/her life as an individual in recovery  11. Resurrection Hero - The hero faces a final test where everything is at stake and he must use everything he has learned. This can be cutting off old using friends, making new friends, engaging in new and healthy activities, obtaining and working with a sponsor, ect   12. Return with Elixir: The hero brings his knowledge or the \"elixir\" back to the ordinary world, where he applies it to help all who remain there.  Carrying the message, helping others, giving back, living a life of purpose      Assessment:    Stages of Change Model  Preparation/Determination    Client shares openly. Client presents as honest and discusses what is going on for him; both struggles and successes.     Plan:   Client will continue with support groups and complete the weekend task he has been assigned     Ofelia SWEENEY, DARREL  "

## 2019-12-03 NOTE — PROGRESS NOTES
"INDIVIDUAL THERAPY NOTE    Data: Client talked about his low motivation for treatment when he is not here. He states when he gets here he is fine, but the evening before and in the morning he is \"dreading\" coming to treatment. He talked about how when he is here he has motivation and always feels better when he leaves but just\" dreads\" it the afternoon and evening before. He talked about how he thinks this is because he is so tired.     He talked about how he is working on his assignment about decision making but other then that he is up to date.     He talked about how spiritually fit he feels and how nice it feels to be mending the relationship with his family. He talked about feeling as though this is the most important thing for him in his life.     Intervention:  I talked with hi about his treatment plan and his treatment plan work.     I talked with him about his declining motivation and action steps over the last several weeks. I challenged him on the need for action and putting his recovery first or he will lose the other things in his life that are important to him; like his family. I talked about the need for action not just insight. I challenged him on some blind spots with the length of his declining motivation, him lacking follow through on things we have been talking about and my overall concerns about his progress if he continues to put other things before his recovery. I also pointed out that while he reported a positive and supportive experience at the meeting he attended he has not been back. I provided this as a prime example of doing bare minimum and lack of necessary follow through. I reminded him this is not the first time we have talked about this and how we need to find a solution to get him to start taking action and responsibility for her ongoing recovery efforts. I expressed my concerns about the risk of relapse if he doesn't do this. I talked with him about his stagnant progress and how " "we need to get creative to help him understand that eventually he needs to learn how to do this without the support of treatment and my fear of him \"warming a chair\" in treatment instead of doing the work needed. He agreed, even though he didn't really want to hear it. He validated my concerns.     We talked about how next week he will move to phase 3 as a way to help him see that he needs to do a lot more work for his recovery if he wants to stay sober and minimize the risk of going back to using. I talked with him about how if he doesn't do the work we may need to bring him back to a higher level of care. I expressed my concern and care for him and my desire for him to be successful with his efforts around recovery however talking about what he needs to do in treatment with no follow through is not going to be enough.  We talked about the excuses he made and I validated that excuses can be true but they aren't justified in him not having time to do the work; attend meetings, find a sponsor, or reach out to expand his network. He understood the excuses he uses and the need to follow through with action.     I talked with him about how he needs to attend one meeting a week, will start phase three programming next week and needs to not make any more excuses as to why he can't do things that are helping him.       Assessment: Client is open to the feedback and agrees with the concerns. He struggles with follow through and feels justified in his excuses. He intellectually knows the excuses are not valid enough to prevent him from doing the work for his recovery. He is open to going to phase 3 to see what lesser structure looks like and how much work it's going to be for him to take on more responsibility for his recovery.       Plan: Client will start phase 3 next week. He will attend one meeting weekly,bare minimum, seek out sponsorship and start taking on more personal responsibility for his ongoing recovery.   "

## 2019-12-09 ENCOUNTER — HOSPITAL ENCOUNTER (OUTPATIENT)
Dept: BEHAVIORAL HEALTH | Facility: CLINIC | Age: 24
End: 2019-12-09
Attending: SOCIAL WORKER
Payer: COMMERCIAL

## 2019-12-09 PROCEDURE — H2035 A/D TX PROGRAM, PER HOUR: HCPCS

## 2019-12-09 NOTE — PROGRESS NOTES
Gonzalez Delgado  [id number]               Adult CD Progress Note and Treatment Plan Review     Attendance       Tuesday     Group Date: 12/9/2019   Group Attendance Other - Individual with Counselor   Group Therapy Type Psychoeducation and Psychotherapeutic   Group Topic Covered Moving forward and treatment plan update   Client's Response To Group Topic Cooperative with task Listened actively   Client Group Participation Detail Shows interest and Adequate participation   Group Attendance (Time) Other - Individual with Counselor    Individual Attendance 1 Hour   Family Attendance None   Other Comments/Information None         Total # of Phase 1 Group Sessions: 24   Total # of Phase 2 Group Sessions: 6  Total # of Phase 3 Group Sessions: 1 (individual due to low number of clients in phase 3)  Total # of 1:1 Sessions: 3    Support group attended this week: yes    Reporting sobriety: Yes    Treatment Plan Review     Treatment Plan Review completed on:  12/9/2019    Projected discharge date: 1/14/2020    Client preferred learning style: Visual  Hands on  Verbal  Demonstration    Staff member(s) contributing: Ofelia Beatty BS, [facility]    Received supervision: No    Client involvement with treatment planning: contributed to goals and plan.    Client received copy of plan/revised plan: Yes    Client agrees with plan/revised plan: Yes    Changes to Treatment Plan: Yes    Client's Dimension 1 Goal(s) 1. Develop effective strategies to maintain abstinence.   Goal not addressed this week.   Client's Dimension 2 Goal(s) 1. Maintain good physical health. Goal not addressed this week.   Client's Dimension 3 Goal(s) 1. Gonzalez will continuing building and implementing healthy coping strategies to manage emotions Goal not addressed this week.   Client's Dimension 4 Goal(s) 1. Client will demonstrate healthy recovery skills daily.   2. Gonzalez will identify personal problems areas and strategies to improve confidence in those areas See  "below.   Client's Dimension 5 Goal(s) 1. Client will begin working on recovery care plan while in Phase 3  See below.   Client's Dimension 6 Goal(s) 1. Client will continue to build sober network an engage in sober activities with other sober individuals   2. Client will remain compliant with probation See below.     New Goals added since last review: None    Goal(s) worked on since last review:   Client will demonstrate healthy recovery skills daily  Client will continue to build sober network an engage in sober activities with other sober individuals.  Client will develop recovery care plan while in Phase 3     Strategies effective: Yes    Treatment Coordination Activities: None.    Medical, Mental Health and other appointments the client attended: None    Medication issues: None.    Physical and mental health problems: None.    Review and evaluation of the individual abuse prevention plan: The program's abuse prevention plan (VERNA) is sufficient for this client.     Substance Use Disorders:    303.90 (F10.20) Alcohol Use Disorder Severe  304.30 (F12.20) Cannabis Use Disorder Severe  305.10 (F17.200) Tobacco Use Disorder Severe    ASAM Risk Rating: (Note the rationale for risk rating changes)    Dimension 1 0 No change    Dimension 2 0 No change    Dimension 3 1 No change    Dimension 4 1 No change    Dimension 5 3 No change      Dimension 6 2 No change    Data:   Client transitioned to Phase 3 this week and met individually with counselor due to low number of clients in phase 3 at this point. Client and counselor reviewed treatment plan assignments that client is currently working on. Client reports he is feeling a little \"Jealous\" of his brother's new girlfriend because of the amount of time his brother is spending with her. Client and counselor talked about the need to expand social network in order to have other supportive people to reach out to. Client and counselor talked about the saying \"You cant think " "your way into proper action, you have to act yourself into proper thinking\" Client can see he spends most of the time thinking about doing something instead of just doing it. Client reports he wants to act more and think less. Client reports he went to a meeting this weekend and has a number for someone who might make a \"good\" sponsor.  Client was encouraged to call this person. Client talked about his job and his 'three year plan\" Client reports he wants to save money and eventually buy a house. Client reports his family is very supportive and he wants his son to have a supportive family. Client reports his motivation is at a 10 out of 10 and he is ready for Phase 3 and is ready to begin working on his recovery plan  Intervention:   Client and counselor updated treatment plan and client received his WRAP (Wellness Recovery & Action Plan) to begin working on  Counselor discussed the importance of uping support group attendance as his group attendance is decreasing. Client agrees  Assessment:    Stages of Change Model  Action    Client shares openly and has maintained sobriety for over 6 consecutive months. Client presents as honest and discusses what is going on for him; both struggles and successes.     Plan:   Client will call possible sponsor and attend 2 support groups this week.  Client will begin working on his WRAP plan    Ofelia SWEENEY, LADC  "

## 2019-12-16 ENCOUNTER — HOSPITAL ENCOUNTER (OUTPATIENT)
Dept: BEHAVIORAL HEALTH | Facility: CLINIC | Age: 24
End: 2019-12-16
Attending: SOCIAL WORKER
Payer: COMMERCIAL

## 2019-12-16 PROCEDURE — H2035 A/D TX PROGRAM, PER HOUR: HCPCS

## 2019-12-16 NOTE — PROGRESS NOTES
"INDIVIDUAL THERAPY NOTE 12/16/19     Data: Client and I met for 1 hour. Client discussed a using dream he had over the weekend and how he felt \"dirty\" afterwards because it felt very real. Client was happy is was just a dream. Client and counselor discussed the upcoming holidays and strategies to manage any triggers the client may experience. Client reported he went out to eat with his brother and brother's girlfriend and this time he \"felt included\" in the conversation during dinner. Client reports he attended a meeting this week but he still has not reached out to anyone at his meeting to be a possible sponsor. He committed to reaching out at his next meeting. Client reports he applied for a new job with better hours so he can spend more time with his son      Intervention: Client was given \"100 coping skills\" sheet for additional ideas when faced with triggers or cravings and encouraged to complete his commitment to start a conversation with someone at his meeting this week      Assessment: Client continues to appear motivated for long term change but is in need of widening his support [place]. Client was open to the challenge of talking with someone at his next meeting      Plan: Client will have a conversation with someone at his meeting this week and report how the conversation went during his next individual session. Client will also discuss any additional coping strategies he implemented over the week during our next session  "

## 2019-12-23 ENCOUNTER — HOSPITAL ENCOUNTER (OUTPATIENT)
Dept: BEHAVIORAL HEALTH | Facility: CLINIC | Age: 24
End: 2019-12-23
Attending: SOCIAL WORKER
Payer: COMMERCIAL

## 2019-12-23 PROCEDURE — H2035 A/D TX PROGRAM, PER HOUR: HCPCS | Mod: HQ

## 2019-12-23 NOTE — PROGRESS NOTES
Gonzalez Danny  [id number]               Adult CD Progress Note and Treatment Plan Review     Attendance       Monday   Group Date: 12/23/2019   Group Attendance Attended group session   Group Therapy Type Psychoeducation and Psychotherapeutic   Group Topic Covered Emotions/feelings   Client's Response To Group Topic Cooperative with task Listened actively   Client Group Participation Detail Shows interest and Adequate participation   Group Attendance (Time) 2.0 Hours   Individual Attendance None   Family Attendance None   Other Comments/Information None         Total # of Phase 1 Group Sessions: 24   Total # of Phase 2 Group Sessions: 6  Total # of Phase 3 Group Sessions: 3 (individual due to low number of clients in phase 3)  Total # of 1:1 Sessions: 3    Support group attended this week: yes    Reporting sobriety: Yes    Treatment Plan Review     Treatment Plan Review completed on:  12/23/2019    Projected discharge date: 1/28/2020    Client preferred learning style: Visual  Hands on  Verbal  Demonstration    Staff member(s) contributing: Ofelia SWEENEY, [facility]    Received supervision: No    Client involvement with treatment planning: contributed to goals and plan.    Client received copy of plan/revised plan: Yes    Client agrees with plan/revised plan: Yes    Changes to Treatment Plan: Yes    Client's Dimension 1 Goal(s) 1. Develop effective strategies to maintain abstinence.   Goal not addressed this week.   Client's Dimension 2 Goal(s) 1. Maintain good physical health. Goal not addressed this week.   Client's Dimension 3 Goal(s) 1. Gonzalez will continuing building and implementing healthy coping strategies to manage emotions Goal not addressed this week.   Client's Dimension 4 Goal(s) 1. Client will demonstrate healthy recovery skills daily.   2. Gonzalez will identify personal problems areas and strategies to improve confidence in those areas See below.   Client's Dimension 5 Goal(s) 1. Client will begin  "working on recovery care plan while in Phase 3  See below.   Client's Dimension 6 Goal(s) 1. Client will continue to build sober network an engage in sober activities with other sober individuals   2. Client will remain compliant with probation See below.     New Goals added since last review: None    Goal(s) worked on since last review:   Client will demonstrate healthy recovery skills daily  Client will continue to build sober network an engage in sober activities with other sober individuals.  Client will develop recovery care plan while in Phase 3     Strategies effective: Yes    Treatment Coordination Activities: None.    Medical, Mental Health and other appointments the client attended: None    Medication issues: None.    Physical and mental health problems: None.    Review and evaluation of the individual abuse prevention plan: The program's abuse prevention plan (VERNA) is sufficient for this client.     Substance Use Disorders:    303.90 (F10.20) Alcohol Use Disorder Severe  304.30 (F12.20) Cannabis Use Disorder Severe  305.10 (F17.200) Tobacco Use Disorder Severe    ASAM Risk Rating: (Note the rationale for risk rating changes)    Dimension 1 0 No change    Dimension 2 0 No change    Dimension 3 1 No change    Dimension 4 1 No change    Dimension 5 3 No change      Dimension 6 2 No change    Data:   Client shows no signs of intoxication or withdrawal and all UA's have been negative for use. Client reports he continues to walk for physical activity and he rates his health at an 8 out of 10. Client reports no 'bad stress\" this week, but he has experienced \"good\" anxiety (excitement) over a new job offer. Client rates his motivation as a 10 out of 10 and is looking forward to spending [place] with his family. Client reports he is \"excited\" to have his son open Kindra presents. Client does not endorse any triggers or cravings this week, but continues to practice staying in the moment. Client is compliant with " probation and preparing to complete outpatient treatment in the next few weeks    Intervention:   Monday -  We discussed how early recovery can feel like an emotional rollercoaster with all the ups and downs that can arise, especially during the holidays. That s why emotional sobriety is a key factor in early recovery. Emotional sobriety is the ability to cope with the many emotions that come with physical sobriety. It means being able to handle your feelings head on in a positive and productive way. Ways we discussed were not being so hard on ourselves and giving ourselves a break if things get too chaotic, reaching out for help if we need it, and not being afraid to put our needs first. Great participation  Assessment:    Stages of Change Model  Action    Client shares openly and has maintained sobriety for over 6 consecutive months. Client presents as honest and discusses what is going on for him; both struggles and successes.     Plan:   Client will practice emotional sobriety over the break and report his experience next week    Ofelia SWEENEY, LADC

## 2019-12-28 NOTE — ADDENDUM NOTE
Encounter addended by: Ofelia Beatty LADC on: 12/28/2019 10:24 AM   Actions taken: Charge Capture section accepted

## 2020-01-06 ENCOUNTER — HOSPITAL ENCOUNTER (OUTPATIENT)
Dept: BEHAVIORAL HEALTH | Facility: CLINIC | Age: 25
End: 2020-01-06
Attending: SOCIAL WORKER
Payer: COMMERCIAL

## 2020-01-06 PROCEDURE — 80307 DRUG TEST PRSMV CHEM ANLYZR: CPT

## 2020-01-06 PROCEDURE — H2035 A/D TX PROGRAM, PER HOUR: HCPCS | Mod: HQ

## 2020-01-06 PROCEDURE — 80320 DRUG SCREEN QUANTALCOHOLS: CPT

## 2020-01-06 ASSESSMENT — PATIENT HEALTH QUESTIONNAIRE - PHQ9
5. POOR APPETITE OR OVEREATING: NOT AT ALL
SUM OF ALL RESPONSES TO PHQ QUESTIONS 1-9: 0

## 2020-01-06 ASSESSMENT — ANXIETY QUESTIONNAIRES
7. FEELING AFRAID AS IF SOMETHING AWFUL MIGHT HAPPEN: NOT AT ALL
6. BECOMING EASILY ANNOYED OR IRRITABLE: NOT AT ALL
GAD7 TOTAL SCORE: 0
3. WORRYING TOO MUCH ABOUT DIFFERENT THINGS: NOT AT ALL
IF YOU CHECKED OFF ANY PROBLEMS ON THIS QUESTIONNAIRE, HOW DIFFICULT HAVE THESE PROBLEMS MADE IT FOR YOU TO DO YOUR WORK, TAKE CARE OF THINGS AT HOME, OR GET ALONG WITH OTHER PEOPLE: NOT DIFFICULT AT ALL
5. BEING SO RESTLESS THAT IT IS HARD TO SIT STILL: NOT AT ALL
1. FEELING NERVOUS, ANXIOUS, OR ON EDGE: NOT AT ALL
2. NOT BEING ABLE TO STOP OR CONTROL WORRYING: NOT AT ALL

## 2020-01-06 NOTE — PROGRESS NOTES
Gonzalez Danny  [id number]               Adult CD Progress Note and Treatment Plan Review     Attendance       Monday   Group Date: 1/6/2020   Group Attendance Attended group session   Group Therapy Type Psychotherapeutic   Group Topic Covered Individual Session   Client's Response To Group Topic Cooperative with task Expressed understanding of topic Listened actively   Client Group Participation Detail Shows interest and Adequate participation   Group Attendance (Time) 1.0 Hour   Individual Attendance None   Family Attendance None   Other Comments/Information None         Total # of Phase 1 Group Sessions: 24   Total # of Phase 2 Group Sessions: 6  Total # of Phase 3 Group Sessions: 4 (individual due to low number of clients in phase 3)  Total # of 1:1 Sessions: 3    Support group attended this week: yes    Reporting sobriety: Yes    Treatment Plan Review     Treatment Plan Review completed on:  1/6/2020    Projected discharge date: 1/28/2020    Client preferred learning style: Visual  Hands on  Verbal  Demonstration    Staff member(s) contributing: Ofelia Beatty BS, [facility]    Received supervision: No    Client involvement with treatment planning: contributed to goals and plan.    Client received copy of plan/revised plan: Yes    Client agrees with plan/revised plan: Yes    Changes to Treatment Plan: Yes    Client's Dimension 1 Goal(s) 1. Develop effective strategies to maintain abstinence.   See below.   Client's Dimension 2 Goal(s) 1. Maintain good physical health. Goal not addressed this week.   Client's Dimension 3 Goal(s) 1. Gonzalez will continuing building and implementing healthy coping strategies to manage emotions See below.   Client's Dimension 4 Goal(s) 1. Client will demonstrate healthy recovery skills daily.   2. Gonzalez will identify personal problems areas and strategies to improve confidence in those areas See below.   Client's Dimension 5 Goal(s) 1. Client will begin working on recovery care plan  while in Phase 3  See below.   Client's Dimension 6 Goal(s) 1. Client will continue to build sober network an engage in sober activities with other sober individuals   2. Client will remain compliant with probation See below.     New Goals added since last review: None    Goal(s) worked on since last review:   Client will demonstrate healthy recovery skills daily  Client will continue to build sober network an engage in sober activities with other sober individuals.  Client will develop recovery care plan while in Phase 3   Gonzalez will continuing building and implementing healthy coping strategies to manage emotions   Develop effective strategies to maintain abstinence    Strategies effective: Yes    Treatment Coordination Activities: None.    Medical, Mental Health and other appointments the client attended: None    Medication issues: None.    Physical and mental health problems: None.    Review and evaluation of the individual abuse prevention plan: The program's abuse prevention plan (VERNA) is sufficient for this client.     Substance Use Disorders:    303.90 (F10.20) Alcohol Use Disorder Severe  304.30 (F12.20) Cannabis Use Disorder Severe  305.10 (F17.200) Tobacco Use Disorder Severe    ASAM Risk Rating: (Note the rationale for risk rating changes)    Dimension 1 0 No change    Dimension 2 0 No change    Dimension 3 1 No change    Dimension 4 1 No change    Dimension 5 2 No change      Dimension 6 2 No change    Data:   Client submitted UA on 1/6/2020 which was negative for all illicit substances. Client reports he continues to walk for physical activity and he rates his health at an 8 out of 10. 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 Client rates his motivation as a 10 out of 10 and is most thankful for his family. Client reports meditating each morning as his healthy self care habit. Client does not endorse any triggers or cravings  this week, but continues to practice staying in the moment. Client is attending a support group each week and has been encouraged to attend at least 2 a week. Client reports he is half way through his Wellness and Recovery Plan. Client is compliant with probation and preparing to complete outpatient treatment on 1/20/2020    Intervention:   Monday -  Client and counselor discussed his progress and ability to think through decisions without being impulsive.   We also discussed client's need to increase support group attendance as he prepares to complete outpatient treatment.   Assessment:    Stages of Change Model  Action    Client shares openly and has maintained sobriety for over 6 consecutive months. Client presents as honest and discusses what is going on for him; both struggles and successes.     Plan:   Client will attend at least 2 support groups this week    Ofelia SWEENEY, [facility]

## 2020-01-07 ASSESSMENT — ANXIETY QUESTIONNAIRES: GAD7 TOTAL SCORE: 0

## 2020-01-13 ENCOUNTER — HOSPITAL ENCOUNTER (OUTPATIENT)
Dept: BEHAVIORAL HEALTH | Facility: CLINIC | Age: 25
End: 2020-01-13
Attending: SOCIAL WORKER
Payer: COMMERCIAL

## 2020-01-13 PROCEDURE — H2035 A/D TX PROGRAM, PER HOUR: HCPCS | Mod: HQ

## 2020-01-13 NOTE — PROGRESS NOTES
"Gonzalez Delgado  [id number]                                             Adult CD Progress Note      Attendance     Monday   Group Date: 1/13/2020   Group Attendance Attended group session   Group Therapy Type Life skill(s) and Psychotherapeutic   Group Topic Covered Goal Setting and Progress   Client's Response To Group Topic Listened actively   Client Group Participation Detail Highly involved, Shows interest and Adequate participation   Group Attendance (Time) 1.0 Hour   Individual Attendance Met with client individually for the \"group\" hour due to low number of clients in Phase 3   Family Attendance None   Other Comments/Information None       Data:   Client reports no significant issues this week. He reports STS is going well and he is staying physically active by cutting wood and walking. Client reports feeling triggered last week when a coworker mentioned he was taking muscle relaxers. Client reports he played the tape through and went to a meeting that day. Client shared his recovery plan and was able to identify behavioral and emotional warning signs associated with  slipping  and what he would do if he catches himself engaging in these behaviors. Client shared a list of goals he has for himself and what he is doing to accomplish each goal. Client reports being focused on paying off his fines and getting his license back. He rates his motivation at a 10 out of 10 and reports being thankful for all of the good things he has experienced in the last 8 months of sobriety. Client will complete outpatient treatment next week 1/20/2020 and reports being  super excited  to have completed 6 months of treatment.    Intervention:   Counselor and client discussed the importance of keeping up on meetings for support and communicating how he feels. We also discussed the importance of self care and keeping a healthy work/life balance    Assessment:    Stages of Change Model  Action   Client has been sober for 8 months and " continues to show motivation for long term change    Appears/Sounds:  Cooperative  Motivated  Engaged    Plan:   Client will complete outpatient program next Monday 1/20/2020     DARREL Amaral

## 2020-01-20 ENCOUNTER — HOSPITAL ENCOUNTER (OUTPATIENT)
Dept: BEHAVIORAL HEALTH | Facility: CLINIC | Age: 25
End: 2020-01-20
Attending: SOCIAL WORKER
Payer: COMMERCIAL

## 2020-01-20 PROCEDURE — H2035 A/D TX PROGRAM, PER HOUR: HCPCS | Mod: HQ

## 2020-01-20 NOTE — PROGRESS NOTES
[facility]      Patient: Gonzalez Delgado  MRN: [id number]  : 1995 Age: 24 Sex: Male     Focus of Treatment / Discharge Recommendations     Personal Safety/ Management of Symptoms     * Follow your safety plan.  Report increased symptoms to your care team and /or go to the nearest Emergency Department.     * Call crisis lines as needed     [facility] [phone]                [facility] [phone]  [facility] [phone]              [facility] [phone]  [facility] [phone]              [facility] [phone]  [facility] [phone]          National Suicide Prevention [phone]  [facility] [phone]            Suicide Prevention [phone]  [facility] [phone]     Abstinence/Relapse Prevention  * Take all medicines as directed.  Carry a current list of medicines with you.  * Use coping skills to help manage your daily stressors.   * Do not use illicit (street) drugs, controlled substances (narcotics) or alcohol.     Develop/Improve Independent Living/Socialization Skills: Continue to develop and improve your independent living skills and socialization skills. Ensure you are attending support meetings, communicating with your sponsor, practicing healthy communication skills and remembering your healthy coping strategies when dealing with daily stressors. Ensure you are engaging in self care activities daily. Maintain consistent employment and secure housing both of which are conducive to your recovery. Remain abstinent from all mood altering chemicals.     Community Resources/Supports and Discharge Planning:  Ensure you are attending any and all aftercare recommendations. At this time, it is recommended you attend mental health therapy 1 - 2 times a month. Stay compliant with all parities involved in your case.        Client Signature:  Gonzalez Delgado ____________________________Date /Time: January  20, 2020         Staff Signature: ZAHRA Melendez, [facility]   Date /Time: January 20, 2020   12:21PM

## 2020-01-20 NOTE — PROGRESS NOTES
"Gonzalez Delgado  [id number]                                             Adult CD Progress Note      Attendance     Monday   Group Date: 1/20/2020   Group Attendance Attended group session   Group Therapy Type Life skill(s)   Group Topic Covered Discharge instructions   Client's Response To Group Topic Cooperative with task   Client Group Participation Detail Highly involved   Group Attendance (Time) 1.0 Hour   Individual Attendance None   Family Attendance None   Other Comments/Information None       Data:   Client talked about his plans for maintaining his recovery including attending meetings and working with a sponsor. Client reports he has an \"excellent\" support system and his brother is \"like my best friend\" Client reports he and his brother participate in sober activities together and he talks with his other friend \"Medina\" at least once a week    Intervention:   Counselor commended client on his progress and all the work he has done. Counselor suggested he explore options for continued mental health therapy at least 1 to 2 times a month as a preventive measure    Assessment:    Stages of Change Model  Action  Maintenance   Client has been abstinent from all chemicals for approximately 8 months and his behaviors are conducive to recovery  Client appears motivated to continue with positive change in lifestyle and was in agreement with recommendations    Plan:   Client will discharge on 1/27/2020 after final appointment with Lizzy Tenorio MA, LILIANAC, [facility]    DARREL Amaral  "

## 2020-01-27 ENCOUNTER — HOSPITAL ENCOUNTER (OUTPATIENT)
Dept: BEHAVIORAL HEALTH | Facility: CLINIC | Age: 25
End: 2020-01-27
Attending: SOCIAL WORKER
Payer: COMMERCIAL

## 2020-01-27 PROCEDURE — H2035 A/D TX PROGRAM, PER HOUR: HCPCS

## 2020-01-27 NOTE — PROGRESS NOTES
INDIVIDUAL THERAPY NOTE  TIME: 12:00pm  Duration: 45 Minutes    Data: Client talked about how he is thinking about setting up more long term individual therapy. I referred him to [facility] here in [place] as this location is convenient. He also talked about how he has been attending 2 meetings a week however when he went on to talk about these meetings it doesn't sound like consistent attendance. He talked about how he is working on getting his drivers license back and paying fines. He talked about the excitement around having some more freedom that comes with driving.       Intervention: I talked with him about the importance of structure and follow through when it comes to individual therapy and meetings. We talked about sponsorship and he denied having found a sponsor. We talked about his treatment plan and reviewed his treatment plan problem areas that he and I created and all work was completed and up to date. We talked about his plan to work on getting his son back and what this looks like. We talked about transportation and his work schedule being barriers to this.       Assessment: Client seems to minimize he need to attend meetings and sponsorship. He seems optimistic about his future and confident in his progress.       Plan: Client will discharge. He will work on connecting with an individual therapist such as an [facility] therapist for ongoing support.      Lizzy Tenorio MA, LADC, LPCC

## 2020-01-28 NOTE — ADDENDUM NOTE
Encounter addended by: [facility] on: 1/28/2020 3:54 PM   Actions taken: Visit Navigator Flowsheet section accepted

## 2020-02-03 ENCOUNTER — TELEPHONE (OUTPATIENT)
Dept: BEHAVIORAL HEALTH | Facility: CLINIC | Age: 25
End: 2020-02-03

## 2020-02-03 NOTE — TELEPHONE ENCOUNTER
----- Message from [facility] sent at 2/3/2020  7:36 AM CST -----  Good Morning  Please remove client from all groups and appointments starting today 2/3/2020  Thanks!

## 2020-02-03 NOTE — TELEPHONE ENCOUNTER
----- Message from [facility]elia Beatty [facility] sent at 2/3/2020  7:36 AM CST -----  Good Morning  Please remove client from all groups and appointments starting today 2/3/2020  Thanks!

## 2020-03-11 ENCOUNTER — HEALTH MAINTENANCE LETTER (OUTPATIENT)
Age: 25
End: 2020-03-11

## 2021-01-03 ENCOUNTER — HEALTH MAINTENANCE LETTER (OUTPATIENT)
Age: 26
End: 2021-01-03

## 2021-04-25 ENCOUNTER — HEALTH MAINTENANCE LETTER (OUTPATIENT)
Age: 26
End: 2021-04-25

## 2021-10-10 ENCOUNTER — HEALTH MAINTENANCE LETTER (OUTPATIENT)
Age: 26
End: 2021-10-10

## 2021-10-14 ENCOUNTER — HOSPITAL ENCOUNTER (EMERGENCY)
Facility: HOSPITAL | Age: 26
Discharge: HOME | End: 2021-10-14
Attending: EMERGENCY MEDICINE | Admitting: EMERGENCY MEDICINE
Payer: OTHER MISCELLANEOUS

## 2021-10-14 ENCOUNTER — OFFICE VISIT (OUTPATIENT)
Dept: ORTHOPEDICS | Facility: CLINIC | Age: 26
End: 2021-10-14
Payer: OTHER MISCELLANEOUS

## 2021-10-14 ENCOUNTER — APPOINTMENT (OUTPATIENT)
Dept: ULTRASOUND IMAGING | Facility: HOSPITAL | Age: 26
End: 2021-10-14
Attending: EMERGENCY MEDICINE
Payer: OTHER MISCELLANEOUS

## 2021-10-14 ENCOUNTER — APPOINTMENT (OUTPATIENT)
Dept: RADIOLOGY | Facility: HOSPITAL | Age: 26
End: 2021-10-14
Attending: EMERGENCY MEDICINE
Payer: OTHER MISCELLANEOUS

## 2021-10-14 VITALS
BODY MASS INDEX: 32.9 KG/M2 | DIASTOLIC BLOOD PRESSURE: 83 MMHG | SYSTOLIC BLOOD PRESSURE: 138 MMHG | HEIGHT: 71 IN | HEART RATE: 102 BPM | WEIGHT: 235 LBS

## 2021-10-14 VITALS
WEIGHT: 235 LBS | SYSTOLIC BLOOD PRESSURE: 160 MMHG | HEART RATE: 98 BPM | HEIGHT: 71 IN | TEMPERATURE: 97 F | RESPIRATION RATE: 16 BRPM | OXYGEN SATURATION: 98 % | DIASTOLIC BLOOD PRESSURE: 72 MMHG | BODY MASS INDEX: 32.9 KG/M2

## 2021-10-14 DIAGNOSIS — S89.92XA INJURY OF LEFT KNEE, INITIAL ENCOUNTER: Primary | ICD-10-CM

## 2021-10-14 DIAGNOSIS — M25.562 ACUTE PAIN OF LEFT KNEE: ICD-10-CM

## 2021-10-14 DIAGNOSIS — M79.662 PAIN OF LEFT LOWER LEG: ICD-10-CM

## 2021-10-14 PROCEDURE — 99203 OFFICE O/P NEW LOW 30 MIN: CPT | Performed by: ORTHOPAEDIC SURGERY

## 2021-10-14 PROCEDURE — 73560 X-RAY EXAM OF KNEE 1 OR 2: CPT | Mod: LT

## 2021-10-14 PROCEDURE — 250N000011 HC RX IP 250 OP 636: Performed by: EMERGENCY MEDICINE

## 2021-10-14 PROCEDURE — 93971 EXTREMITY STUDY: CPT | Mod: LT

## 2021-10-14 PROCEDURE — 96372 THER/PROPH/DIAG INJ SC/IM: CPT | Performed by: EMERGENCY MEDICINE

## 2021-10-14 PROCEDURE — 99285 EMERGENCY DEPT VISIT HI MDM: CPT | Mod: 25

## 2021-10-14 PROCEDURE — 29505 APPLICATION LONG LEG SPLINT: CPT | Mod: LT

## 2021-10-14 RX ORDER — KETOROLAC TROMETHAMINE 30 MG/ML
30 INJECTION, SOLUTION INTRAMUSCULAR; INTRAVENOUS ONCE
Status: COMPLETED | OUTPATIENT
Start: 2021-10-14 | End: 2021-10-14

## 2021-10-14 RX ORDER — OXYCODONE AND ACETAMINOPHEN 5; 325 MG/1; MG/1
1 TABLET ORAL EVERY 6 HOURS PRN
Qty: 12 TABLET | Refills: 0 | Status: SHIPPED | OUTPATIENT
Start: 2021-10-14

## 2021-10-14 RX ADMIN — KETOROLAC TROMETHAMINE 30 MG: 30 INJECTION, SOLUTION INTRAMUSCULAR; INTRAVENOUS at 04:27

## 2021-10-14 ASSESSMENT — MIFFLIN-ST. JEOR
SCORE: 2063.08
SCORE: 2063.08

## 2021-10-14 ASSESSMENT — PAIN SCALES - GENERAL: PAINLEVEL: SEVERE PAIN (7)

## 2021-10-14 ASSESSMENT — ENCOUNTER SYMPTOMS: ARTHRALGIAS: 1

## 2021-10-14 NOTE — LETTER
"    10/14/2021         RE: Gonzalez Delgado  [address] [place] [address]        Dear Colleague,    Thank you for referring your patient, Gonzalez Delgado, to the [facility] [place]. Please see a copy of my visit note below.    CHIEF COMPLAINT:   Chief Complaint   Patient presents with     Left Knee - Pain     WC. Onset: 9/29/21. Patient notes he slipped while wiping up coffee and twisted his knee and his knee buckled. He was seen today at the ER and given an immobilizer and crutches. Pain is anterior and posterior. He is currently taking     Knee Pain     1 percocet every 6 hours.    .        HISTORY OF PRESENT ILLNESS    Gonzalez Delgado is a 26 year old male seen for evaluation of ongoing left knee pain since 9/29/2021. Patient states he slipped while wiping up coffee at work, twisting the knee and it buckled. Went to [facility] in [place] and had xrays, reports no broken bones. Was told sprained the knee and recommended home exercise program. Wore a knee brace. Seemed to be ok, but then he had worsened pain overnight last night after doing a 6 hour shift. He had work without problems, went home and was fine, but woke up during the night with pain, and the whole knee was \"locked up\" with excruciating pain. Sort of felt like a \"cramp\" but also like something in the knee was stuck. He presented to the ED today, repeat xrays negative. Given crutches and knee immobilizer. Locates pain to the front and back of the knee but spreads down the leg and up the thigh with movements. Treatment has been percocet from the ED. He is requesting an MRI today.    No swelling. Just really sore.    Present symptoms: pain anteriorly and posteriorly, pain sharp, shooting, dull/achy , moderate pain.    Pain severity: 7/10  Frequency of symptoms: are constant  Exacerbating Factors: weight bearing, bending the knee.  Relieving Factors: rest, medications: percocet.  Night Pain: Yes  Pain while at rest: Yes   Patient has " "tried:     NSAIDS: No      Physical Therapy: No      Activity modification: Yes      Bracing: immobilizer      Injections: No      Ice: No      Assistive device:  crutches    Other PMH:  has no past medical history on file.  Patient Active Problem List   Diagnosis     Chemical dependency (H)       Surgical Hx:  has no past surgical history on file.    Medications:   Current Outpatient Medications:      oxyCODONE-acetaminophen (PERCOCET) 5-325 MG tablet, Take 1 tablet by mouth every 6 hours as needed for moderate to severe pain, Disp: 12 tablet, Rfl: 0  No current facility-administered medications for this visit.    Allergies: No Known Allergies    Social Hx: works at [employer].   reports that he has been smoking cigarettes. He has never used smokeless tobacco. He reports previous alcohol use. He reports previous drug use. Drug: Marijuana.    Family Hx: family history includes Substance Abuse in his father; Unknown/Adopted in his maternal grandmother, mother, paternal grandmother, and son.    REVIEW OF SYSTEMS: 10 point ROS neg other than the symptoms noted above in the HPI and PMH. Notables include  CONSTITUTIONAL:NEGATIVE for fever, chills, change in weight  INTEGUMENTARY/SKIN: NEGATIVE for worrisome rashes, moles or lesions  MUSCULOSKELETAL:See HPI above  NEURO: NEGATIVE for weakness, dizziness or paresthesias    PHYSICAL EXAM:  /83   Pulse 102   Ht 1.803 m (5' 11\")   Wt 106.6 kg (235 lb)   BMI 32.78 kg/m     GENERAL APPEARANCE: healthy, alert, no distress  SKIN: no suspicious lesions or rashes  NEURO: Normal strength and tone, mentation intact and speech normal  PSYCH:  mentation appears normal and affect normal, not anxious  RESPIRATORY: No increased work of breathing.  HANDS: no clubbing, nail pitting  LYMPH: no palpable popliteal lymphadenopathy.    BILATERAL LOWER EXTREMITIES:  Gait: using crutches for support  No gross deformities or masses.  No Quad atrophy, strength limited by pain on " the left.  Intact sensation deep peroneal nerve, superficial peroneal nerve, med/lat tibial nerve, sural nerve, saphenous nerve  Intact EHL, EDL, TA, FHL, GS, quadriceps hamstrings and hip flexors  Toes warm and well perfused, brisk capillary refill. Palpable 2+ dp pulses.  Bilateral calf soft and nttp or squeeze.  DTRs: achilles 2+, patella 2+.  Edema: none    LEFT KNEE EXAM:    Skin: intact, no ecchymosis or erythema  Squat: limited by pain.     ROM: slight hyper extension to 95 flexion, pain limited posteriorly  Tight hamstrings on straight leg raise.  Effusion: trace  Tender: medial joint line, posteromedial knee, popliteal fossa, posterolateral knee  nontender to palpation anterior knee, lateral joint line.  McMurrays: positive medially for pain.    MCL: stable, and non-painful at both 0 and 30 degrees knee flexion  Varus stress: stable, and non-painful at both 0 and 30 degrees knee flexion  Lachmans: grossly stable.  Posterior Drawer stable  Patellofemoral joint:                Apprehension: negative              Crepitations: moderate   Grind: positive.    RIGHT KNEE EXAM:    Skin: intact, no ecchymosis or erythema  ROM: slight hyper extension to 130 flexion  Tight hamstrings on straight leg raise.  Effusion: none  Tender: NTTP med/lat joint line, anterior or posterior knee  McMurrays: negative    MCL: stable, and non-painful at both 0 and 30 degrees knee flexion  Varus stress: stable, and non-painful at both 0 and 30 degrees knee flexion  Lachmans: neg, firm endpoint  Posterior Drawer stable  Patellofemoral joint:                Apprehension: negative              Crepitations: moderate     X-RAY:  2 views left knee from 10/14/2021 were reviewed in clinic today. On my review, no obvious fractures or dislocations.    DVT scan 10/14/2021 : negative for DVT.       ASSESSMENT/PLAN: Gonzalez Delgado is a 26 year old male with acute left knee pain, concern for medial meniscus tear.     * exam, images reviewed  *  "concern of displaced meniscal tear causing \"locking\" in the knee. May also have had a cramp.  * recommend MRI for further evaluation  * rest, ice, elevation, crutches, knee immobilizer  * over the counter pain control. No opioids will be prescribed by this provider  * will call regarding MRI findings, treatment options  * patient declines need for workability note stating already has one.      Jabari Stinson M.D., M.S.  Dept. of Orthopaedic Surgery  [facility]          Again, thank you for allowing me to participate in the care of your patient.        Sincerely,        Jabari Stinson MD    "

## 2021-10-14 NOTE — PROGRESS NOTES
"CHIEF COMPLAINT:   Chief Complaint   Patient presents with     Left Knee - Pain     WC. Onset: 9/29/21. Patient notes he slipped while wiping up coffee and twisted his knee and his knee buckled. He was seen today at the ER and given an immobilizer and crutches. Pain is anterior and posterior. He is currently taking     Knee Pain     1 percocet every 6 hours.    .        HISTORY OF PRESENT ILLNESS    Gonzalez Delgado is a 26 year old male seen for evaluation of ongoing left knee pain since 9/29/2021. Patient states he slipped while wiping up coffee at work, twisting the knee and it buckled. Went to [facility] in [place] and had xrays, reports no broken bones. Was told sprained the knee and recommended home exercise program. Wore a knee brace. Seemed to be ok, but then he had worsened pain overnight last night after doing a 6 hour shift. He had work without problems, went home and was fine, but woke up during the night with pain, and the whole knee was \"locked up\" with excruciating pain. Sort of felt like a \"cramp\" but also like something in the knee was stuck. He presented to the ED today, repeat xrays negative. Given crutches and knee immobilizer. Locates pain to the front and back of the knee but spreads down the leg and up the thigh with movements. Treatment has been percocet from the ED. He is requesting an MRI today.    No swelling. Just really sore.    Present symptoms: pain anteriorly and posteriorly, pain sharp, shooting, dull/achy , moderate pain.    Pain severity: 7/10  Frequency of symptoms: are constant  Exacerbating Factors: weight bearing, bending the knee.  Relieving Factors: rest, medications: percocet.  Night Pain: Yes  Pain while at rest: Yes   Patient has tried:     NSAIDS: No      Physical Therapy: No      Activity modification: Yes      Bracing: immobilizer      Injections: No      Ice: No      Assistive device:  crutches    Other PMH:  has no past medical history on file.  Patient Active Problem " "List   Diagnosis     Chemical dependency (H)       Surgical Hx:  has no past surgical history on file.    Medications:   Current Outpatient Medications:      oxyCODONE-acetaminophen (PERCOCET) 5-325 MG tablet, Take 1 tablet by mouth every 6 hours as needed for moderate to severe pain, Disp: 12 tablet, Rfl: 0  No current facility-administered medications for this visit.    Allergies: No Known Allergies    Social Hx: works at [employer].   reports that he has been smoking cigarettes. He has never used smokeless tobacco. He reports previous alcohol use. He reports previous drug use. Drug: Marijuana.    Family Hx: family history includes Substance Abuse in his father; Unknown/Adopted in his maternal grandmother, mother, paternal grandmother, and son.    REVIEW OF SYSTEMS: 10 point ROS neg other than the symptoms noted above in the HPI and PMH. Notables include  CONSTITUTIONAL:NEGATIVE for fever, chills, change in weight  INTEGUMENTARY/SKIN: NEGATIVE for worrisome rashes, moles or lesions  MUSCULOSKELETAL:See HPI above  NEURO: NEGATIVE for weakness, dizziness or paresthesias    PHYSICAL EXAM:  /83   Pulse 102   Ht 1.803 m (5' 11\")   Wt 106.6 kg (235 lb)   BMI 32.78 kg/m     GENERAL APPEARANCE: healthy, alert, no distress  SKIN: no suspicious lesions or rashes  NEURO: Normal strength and tone, mentation intact and speech normal  PSYCH:  mentation appears normal and affect normal, not anxious  RESPIRATORY: No increased work of breathing.  HANDS: no clubbing, nail pitting  LYMPH: no palpable popliteal lymphadenopathy.    BILATERAL LOWER EXTREMITIES:  Gait: using crutches for support  No gross deformities or masses.  No Quad atrophy, strength limited by pain on the left.  Intact sensation deep peroneal nerve, superficial peroneal nerve, med/lat tibial nerve, sural nerve, saphenous nerve  Intact EHL, EDL, TA, FHL, GS, quadriceps hamstrings and hip flexors  Toes warm and well perfused, brisk capillary " "refill. Palpable 2+ dp pulses.  Bilateral calf soft and nttp or squeeze.  DTRs: achilles 2+, patella 2+.  Edema: none    LEFT KNEE EXAM:    Skin: intact, no ecchymosis or erythema  Squat: limited by pain.     ROM: slight hyper extension to 95 flexion, pain limited posteriorly  Tight hamstrings on straight leg raise.  Effusion: trace  Tender: medial joint line, posteromedial knee, popliteal fossa, posterolateral knee  nontender to palpation anterior knee, lateral joint line.  McMurrays: positive medially for pain.    MCL: stable, and non-painful at both 0 and 30 degrees knee flexion  Varus stress: stable, and non-painful at both 0 and 30 degrees knee flexion  Lachmans: grossly stable.  Posterior Drawer stable  Patellofemoral joint:                Apprehension: negative              Crepitations: moderate   Grind: positive.    RIGHT KNEE EXAM:    Skin: intact, no ecchymosis or erythema  ROM: slight hyper extension to 130 flexion  Tight hamstrings on straight leg raise.  Effusion: none  Tender: NTTP med/lat joint line, anterior or posterior knee  McMurrays: negative    MCL: stable, and non-painful at both 0 and 30 degrees knee flexion  Varus stress: stable, and non-painful at both 0 and 30 degrees knee flexion  Lachmans: neg, firm endpoint  Posterior Drawer stable  Patellofemoral joint:                Apprehension: negative              Crepitations: moderate     X-RAY:  2 views left knee from 10/14/2021 were reviewed in clinic today. On my review, no obvious fractures or dislocations.    DVT scan 10/14/2021 : negative for DVT.       ASSESSMENT/PLAN: Gonzalez Delgado is a 26 year old male with acute left knee pain, concern for medial meniscus tear.     * exam, images reviewed  * concern of displaced meniscal tear causing \"locking\" in the knee. May also have had a cramp.  * recommend MRI for further evaluation  * rest, ice, elevation, crutches, knee immobilizer  * over the counter pain control. No opioids will be " prescribed by this provider  * will call regarding MRI findings, treatment options  * patient declines need for workability note stating already has one.      Jabari Stinson M.D., M.S.  Dept. of Orthopaedic Surgery  [facility]

## 2021-10-14 NOTE — DISCHARGE INSTRUCTIONS
Knee immobilizer and crutches and avoid weightbearing for the next 2 weeks.  See your doctor or see your clinic in the next 2 weeks for follow-up.  If problem persist, possible orthopedic referral, CT or MRI of the knee.  Elevate whenever possible.  Ice off-and-on whenever hurting.  Tylenol or ibuprofen every 6 hours as needed for pain.  1 Percocet every 6 hours instead only if needed for stronger pain.  Do not drive with this.

## 2021-10-14 NOTE — ED PROVIDER NOTES
EMERGENCY DEPARTMENT ENCOUNTER      NAME: Gonzalez Delgado  AGE: 26 year old male  YOB: 1995  MRN: [id number]  EVALUATION DATE & TIME: No admission date for patient encounter.    PCP: No Ref-Primary, Physician    ED PROVIDER: Francisco Salamanca M.D.      Chief Complaint   Patient presents with     Leg Pain         FINAL IMPRESSION:  1.  Acute left leg pain.  2.  Acute left knee pain.  3.  Acute left knee lateral collateral ligament strain.    ED COURSE & MEDICAL DECISION MAKIN:16 AM I met with the patient to gather history and to perform my initial exam. We discussed plans for the ED course, including diagnostic testing and treatment. PPE worn: cloth mask.  Pain in the left calf and thigh as well as in the left knee.  Particularly tender over the lateral collateral ligament.  Patient denies any fall, accident, or trauma.  5:10 AM.  Negative left knee x-ray and negative left leg ultrasound.  Findings consistent with a lateral collateral ligament sprain.  Crutches and knee immobilizer ordered.  Prescription for Percocet.  Patient given Toradol for pain which she notes helped minimally.  However, patient is driving.      Pertinent Labs & Imaging studies reviewed. (See chart for details)    26 year old male presents to the Emergency Department for evaluation of left leg and knee pain.    At the conclusion of the encounter I discussed the results of all of the tests and the disposition. The questions were answered. The patient or family acknowledged understanding and was agreeable with the care plan.       MEDICATIONS GIVEN IN THE EMERGENCY:  Medications - No data to display    NEW PRESCRIPTIONS STARTED AT TODAY'S ER VISIT  New Prescriptions    No medications on file          =================================================================    HPI    Patient information was obtained from: Patient    Use of : N/A        Gonzalez Delgado is a 26 year old male with no pertinent history who presents to  this ED by walk in for evaluation of leg pain.    Patient reports that he has had left knee pain since 0100. 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 His leg feels like his tendon is locked up and is hard to move his leg at all. Took ibuprofen and a shower with no relief. Has a history with knee pain and had a x-ray last week that was negative. Patient drove himself here.     Patient does not identify any waxing or waning symptoms otherwise, exacerbating or alleviating features,associated symptoms except as mentioned. He denies any pain related complaints.    REVIEW OF SYSTEMS   Review of Systems   Musculoskeletal: Positive for arthralgias (left knee pain).        PAST MEDICAL HISTORY:  History reviewed. No pertinent past medical history.    PAST SURGICAL HISTORY:  History reviewed. No pertinent surgical history.        CURRENT MEDICATIONS:    No current outpatient medications on file.      ALLERGIES:  No Known Allergies    FAMILY HISTORY:  Family History   Problem Relation Age of Onset     Unknown/Adopted Mother      Substance Abuse Father      Unknown/Adopted Maternal Grandmother      Unknown/Adopted Paternal Grandmother      Unknown/Adopted Son        SOCIAL HISTORY:   Social History     Socioeconomic History     Marital status: Single     Spouse name: None     Number of children: None     Years of education: None     Highest education level: None   Occupational History     None   Tobacco Use     Smoking status: Current Every Day Smoker     Types: Cigarettes     Smokeless tobacco: Never Used   Substance and Sexual Activity     Alcohol use: Not Currently     Comment: 5 drinks per day  2x every other  week- last use-5/10/19     Drug use: Not Currently     Types: Marijuana     Comment: Feb. of 2019 last use, was daily     Sexual activity: None   Other Topics Concern     Parent/sibling w/ CABG, MI or angioplasty before  "65F 55M? Not Asked   Social History Narrative     None     Social Determinants of Health     Financial Resource Strain:      Difficulty of Paying Living Expenses:    Food Insecurity:      Worried About Running Out of Food in the Last Year:      Ran Out of Food in the Last Year:    Transportation Needs:      Lack of Transportation (Medical):      Lack of Transportation (Non-Medical):    Physical Activity:      Days of Exercise per Week:      Minutes of Exercise per Session:    Stress:      Feeling of Stress :    Social Connections:      Frequency of Communication with Friends and Family:      Frequency of Social Gatherings with Friends and Family:      Attends Yarsani Services:      Active Member of Clubs or Organizations:      Attends Club or Organization Meetings:      Marital Status:    Intimate Partner Violence:      Fear of Current or Ex-Partner:      Emotionally Abused:      Physically Abused:      Sexually Abused:    Patient smokes.  Past history of alcohol and marijuana abuse.    VITALS:  BP (!) 160/72   Pulse 98   Temp 97  F (36.1  C)   Resp 16   Ht 1.803 m (5' 11\")   Wt 106.6 kg (235 lb)   SpO2 98%   BMI 32.78 kg/m      PHYSICAL EXAM    Vital Signs:  BP (!) 160/72   Pulse 98   Temp 97  F (36.1  C)   Resp 16   Ht 1.803 m (5' 11\")   Wt 106.6 kg (235 lb)   SpO2 98%   BMI 32.78 kg/m    General:  On entering the room patient is in no apparent distress.    Neck:  Neck supple with full range of motion and nontender.    Back:  Back and spine are nontender.  No costovertebral angle tenderness.    HEENT:  Oropharynx clear with moist mucous membranes.  HEENT unremarkable.    Pulmonary:  Chest clear to auscultation without rhonchi rales or wheezing.    Cardiovascular:  Cardiac regular rate and rhythm without murmurs rubs or gallops.    Abdomen:  Abdomen soft nontender.  There is no rebound or guarding.    Muskuloskeletal:  he moves all 4 without any difficulty and has normal neurovascular exams.  " Extremities without clubbing, cyanosis, or edema.  Legs and calves are nontender on the right.  Mild tenderness in the left calf and left thigh.  The knee is tender.  Negative anterior posterior drawer signs.  No medial or lateral laxity.  There is tenderness over the lateral collateral ligament with stressing.    Neuro:  he is alert and oriented ×3 and moves all extremities symmetrically.    Psych:  Normal affect.    Skin:  Unremarkable and warm and dry.       LAB:  All pertinent labs reviewed and interpreted.  Labs Ordered and Resulted from Time of ED Arrival Up to the Time of Departure from the ED - No data to display    RADIOLOGY:  Reviewed all pertinent imaging. Please see official radiology report.  XR Knee Left 1/2 Views   Final Result   IMPRESSION: Normal joint spaces and alignment. No fracture or joint effusion.      US Lower Extremity Venous Duplex Left   Final Result   IMPRESSION:   1.  No deep venous thrombosis in the left lower extremity.                 EKG:            PROCEDURES:   None      I, Saurabh Davis, am serving as a scribe to document services personally performed by Dr. Salamanca based on my observation and the provider's statements to me. I, Francisco Salamanca MD attest that Saurabh Davis is acting in a scribe capacity, has observed my performance of the services and has documented them in accordance with my direction.    Francisco Salamanca M.D.  Emergency Medicine  [facility] EMERGENCY DEPARTMENT  [facility] [facility] [address] [phone]  Dept: [phone]     Francisco Salamanca MD  10/14/21 0532

## 2021-10-15 ENCOUNTER — HOSPITAL ENCOUNTER (OUTPATIENT)
Dept: MRI IMAGING | Facility: CLINIC | Age: 26
Discharge: HOME | End: 2021-10-15
Attending: ORTHOPAEDIC SURGERY | Admitting: ORTHOPAEDIC SURGERY
Payer: OTHER MISCELLANEOUS

## 2021-10-15 DIAGNOSIS — M25.562 ACUTE PAIN OF LEFT KNEE: ICD-10-CM

## 2021-10-15 DIAGNOSIS — S89.92XA INJURY OF LEFT KNEE, INITIAL ENCOUNTER: ICD-10-CM

## 2021-10-15 LAB — RADIOLOGIST FLAGS: NORMAL

## 2021-10-15 PROCEDURE — 73721 MRI JNT OF LWR EXTRE W/O DYE: CPT | Mod: LT

## 2021-10-15 PROCEDURE — 73721 MRI JNT OF LWR EXTRE W/O DYE: CPT | Mod: 26 | Performed by: RADIOLOGY

## 2021-10-16 ENCOUNTER — NURSE TRIAGE (OUTPATIENT)
Dept: NURSING | Facility: CLINIC | Age: 26
End: 2021-10-16

## 2021-10-16 ENCOUNTER — TELEPHONE (OUTPATIENT)
Dept: FAMILY MEDICINE | Facility: CLINIC | Age: 26
End: 2021-10-16

## 2021-10-16 NOTE — TELEPHONE ENCOUNTER
He was in clinic on Thursday. From work injury. Had xray, MRI done last night. Could be a meniscus tear. He's in a brace, on crutches.  He's at work and she's concerned that he may not make it thru the day with the pain. He can't use percocet while at work and he is up and down all day, as . If he is finding his pain isn't tolerable on Tylenol or ibuprofen, I encouraged her to take him to urgent care or the ER for them to write for more pain meds and a MD note for work. His wife agrees with that plan. She will contact orthopedics on Monday morning.  Jade Lei RN  [facility]      Impression:  1. Bucket-handle tear of the medial meniscus with a meniscal fragment  dislodged into the intercondylar notch. Additional horizontal oblique  tear of the posterior horn of the medial meniscus.  2. Overall preserved articular cartilaginous surfaces in all 3  compartments.  3. Intact ACL, posterior cruciate ligament, medial and lateral  supporting structures, lateral meniscus.      [Consider Follow Up: Bucket-handle tear medial meniscus with dislodged  fragment in the intercondylar notch.     This report will be copied to the [facility] to ensure a  provider acknowledges the finding. [facility] is available Monday  through Friday 8am-3:30 pm.    Reason for Disposition    Health Information question, no triage required and triager able to answer question    Additional Information    Negative: [1] Caller is not with the adult (patient) AND [2] reporting urgent symptoms    Negative: Lab result questions    Negative: Medication questions    Negative: Caller can't be reached by phone    Negative: Caller has already spoken to PCP or another triager    Negative: RN needs further essential information from caller in order to complete triage    Negative: Requesting regular office appointment    Negative: [1] Caller requesting NON-URGENT health information AND [2] PCP's office is the best  resource    Protocols used: INFORMATION ONLY CALL - NO TRIAGE-A-AH

## 2021-10-16 NOTE — TELEPHONE ENCOUNTER
Pt want to see Jabari Goldberg to go over MRI results and see  what to do next. Please call him back.

## 2021-10-18 NOTE — TELEPHONE ENCOUNTER
Patient is already scheduled to see Dr. Chaudhary on 10/25/21.  There are no sooner openings with Dr. Stinson.  Called patient.  He is going to [facility] as he was able to be seen tomorrow.  Requested we cancel appt with Dr. Chaudhary.  Cancelled.    Yanelis Swanson, RN, MSN     Was called by Dr. Torres regarding for uncontrolled Aflutter rate at 140s-150s bpm on tele.   On arrival to unit, pt is Aflutter at 120s on tele. Pt is in bed and reported to have palpitation a hour ago, but currently denies palpitation, dizziness/ light headedness.     BP: 90s/50s, HR: 120s   Physical exam:   General: calm, no acute distress  Pul: + wheezing, no rales  CV: irregular rhythm, normal S1S2, + 3/6 systolic murmur     A/P:   89 year old male with pmh of anxiety, basal cell carcinoma, bph, ckd, htn, hypothyroid, lymphoma, postherpetic neuralgia, prostate ca coming to  ED with complaints of worsening shortness of breath x 3 days who was found to have CHF Exacerbation with HCAP. In this evening, Pt having uncontrolled Aflutter at rate 140s-150s and down to 70s in flutter after Cardizem 10 mg IVP x1.     - continue tele  - Cardizem 10 mg IV Q 6 hrs PRN for HR > 100 bpm (hold for SBP < 90 mmHg)   - pt on Coumadin (INR: 3.01 today)  - continue Toprol XL 50 mg BID for rate control   - follow up with Cardiologist in am     Discussed the plan with Dr. Torres and 3N staff RN.     Brijesh-Josafat Gage [facility] Was called by Dr. Torres regarding for uncontrolled Aflutter rate at 140s-150s bpm on tele.   On arrival to unit, pt is in Aflutter at 120s bpm on tele. Pt is in bed and reported to have palpitation a hour ago, but currently denies palpitation, dizziness/ light headedness.     BP: 90s/50s, HR: 120s   Physical exam:   General: calm, no acute distress  Pul: + wheezing, no rales  CV: irregular rhythm, normal S1S2, + 3/6 systolic murmur     A/P:   89 year old male with pmh of anxiety, basal cell carcinoma, bph, ckd, htn, hypothyroid, lymphoma, postherpetic neuralgia, prostate ca coming to  ED with complaints of worsening shortness of breath x 3 days was found to have CHF Exacerbation with HCAP. In this evening, Pt having uncontrolled Aflutter at rate 140s-150s and down to 70s in flutter after Cardizem 10 mg IVP x1.     - continue tele  - Cardizem 10 mg IV Q 6 hrs PRN for HR > 100 bpm (hold for SBP < 90 mmHg)   - pt on Coumadin (INR: 3.01 today)  - continue Toprol XL 50 mg BID for rate control   - follow up with Cardiologist in am     Discussed the plan with Dr. Torres and 3N staff RN.     Brijesh-Josafat Gage [facility]

## 2021-10-19 ENCOUNTER — TRANSFERRED RECORDS (OUTPATIENT)
Dept: HEALTH INFORMATION MANAGEMENT | Facility: CLINIC | Age: 26
End: 2021-10-19

## 2021-12-02 ENCOUNTER — THERAPY VISIT (OUTPATIENT)
Dept: PHYSICAL THERAPY | Facility: CLINIC | Age: 26
End: 2021-12-02
Payer: OTHER MISCELLANEOUS

## 2021-12-02 DIAGNOSIS — M25.562 ACUTE PAIN OF LEFT KNEE: ICD-10-CM

## 2021-12-02 DIAGNOSIS — G89.18 ACUTE POST-OPERATIVE PAIN: ICD-10-CM

## 2021-12-02 PROCEDURE — 97110 THERAPEUTIC EXERCISES: CPT | Mod: GP | Performed by: PHYSICAL THERAPIST

## 2021-12-02 PROCEDURE — 97010 HOT OR COLD PACKS THERAPY: CPT | Mod: GP | Performed by: PHYSICAL THERAPIST

## 2021-12-02 PROCEDURE — 97161 PT EVAL LOW COMPLEX 20 MIN: CPT | Mod: GP | Performed by: PHYSICAL THERAPIST

## 2021-12-02 ASSESSMENT — ACTIVITIES OF DAILY LIVING (ADL)
STIFFNESS: THE SYMPTOM AFFECTS MY ACTIVITY SEVERELY
SQUAT: I AM UNABLE TO DO THE ACTIVITY
STAND: ACTIVITY IS FAIRLY DIFFICULT
SWELLING: THE SYMPTOM AFFECTS MY ACTIVITY MODERATELY
GO DOWN STAIRS: ACTIVITY IS VERY DIFFICULT
GIVING WAY, BUCKLING OR SHIFTING OF KNEE: THE SYMPTOM AFFECTS MY ACTIVITY SEVERELY
AS_A_RESULT_OF_YOUR_KNEE_INJURY,_HOW_WOULD_YOU_RATE_YOUR_CURRENT_LEVEL_OF_DAILY_ACTIVITY?: SEVERELY ABNORMAL
SIT WITH YOUR KNEE BENT: ACTIVITY IS NOT DIFFICULT
LIMPING: THE SYMPTOM PREVENTS ME FROM ALL DAILY ACTIVITIES
WALK: ACTIVITY IS VERY DIFFICULT
GO UP STAIRS: ACTIVITY IS VERY DIFFICULT
KNEEL ON THE FRONT OF YOUR KNEE: I AM UNABLE TO DO THE ACTIVITY
PAIN: THE SYMPTOM AFFECTS MY ACTIVITY MODERATELY
HOW_WOULD_YOU_RATE_THE_CURRENT_FUNCTION_OF_YOUR_KNEE_DURING_YOUR_USUAL_DAILY_ACTIVITIES_ON_A_SCALE_FROM_0_TO_100_WITH_100_BEING_YOUR_LEVEL_OF_KNEE_FUNCTION_PRIOR_TO_YOUR_INJURY_AND_0_BEING_THE_INABILITY_TO_PERFORM_ANY_OF_YOUR_USUAL_DAILY_ACTIVITIES?: 40
KNEE_ACTIVITY_OF_DAILY_LIVING_SUM: 20
RISE FROM A CHAIR: ACTIVITY IS SOMEWHAT DIFFICULT
RAW_SCORE: 20
WEAKNESS: THE SYMPTOM AFFECTS MY ACTIVITY SEVERELY
KNEE_ACTIVITY_OF_DAILY_LIVING_SCORE: 28.57
HOW_WOULD_YOU_RATE_THE_OVERALL_FUNCTION_OF_YOUR_KNEE_DURING_YOUR_USUAL_DAILY_ACTIVITIES?: SEVERELY ABNORMAL

## 2021-12-02 NOTE — LETTER
[facility]  [address]  [facility] [address]  [phone]    2021    Re: Gonzalez Delgado   :   1995  MRN:  [id number]   REFERRING PHYSICIAN:   MD AKHIL Arnold [facility]    Date of Initial Evaluation:  2021  Visits:  Rxs Used: 1  Reason for Referral:     Acute pain of left knee  Acute post-operative pain    EVALUATION SUMMARY    Physical Therapy Initial Evaluation  Subjective:  The history is provided by the patient. No  was used.   Therapist Generated HPI Evaluation  Problem details: Onset of knee pain on 21 when she slipped on some coffee at work. Ultimately underwent knee scope on 10/29/21. Did a repair of a bucket handle tear of the meniscus. Since surgery, things are improving but still limited. Is off pain meds and is taking only IBP or Tylenol. Notes there are times he feels he could use a pain med but doesn't. Notes most of the pain is when he bends it. Walking with the crutches is OK but going down stairs is terrible. Is currently 50% WB, work restrictions. No specific bending restriction from the MD. .         Type of problem:  Left knee.    This is a new condition.  Condition occurred with:  A twist.  Where condition occurred: at work.  Patient reports pain:  Medial and anterior.  Pain is described as sharp and stabbing (hot) and is intermittent.  Pain is the same all the time.  Since onset symptoms are gradually improving.  Associated symptoms:  Loss of motion/stiffness, loss of strength, edema and buckling/giving out. Symptoms are exacerbated by walking, ascending stairs and descending stairs (bending, not really doing much right now)  and relieved by analgesics and NSAID's.  Special tests included:  X-ray and MRI (bucket handle tear).  Previous treatment includes surgery.   Restrictions due to condition include:  Currently not working due to present  treatment.  Barriers include:  None as reported by patient.              Re: Gonzalez Delgado   :   1995    Patient Health History    Pain is reported as 5/10 on pain scale.  General health as reported by patient is excellent.     Red flags:  Changes in bowel and bladder habits and pain at rest/night (knee pain at night and constipation from pain meds).  Medical allergies: none.   Surgeries include:  Orthopedic surgery.    Current medications:  Anti-inflammatory.    Current occupation is Management [employer]   Primary job tasks include:  Prolonged sitting and driving.        Knee Evaluation:  ROM:    AROM  Extension: Left: 0    Right:   Flexion: Left: 101   Right:  Strength:   Quad Set Left:  Poor    Pain: +/-   Quad Set Right: Good    Pain:  Palpation:  Normal  Edema:  Edema of the knee: slight joint effusion noted around knee.  Mobility Testing:    Patellofemoral Medial:  Left: hypomobile      Patellofemoral Lateral:  Left: hypomobile      Patellofemoral Superior:  Left: normal      Patellofemoral Inferior:  Left: normal          General Evaluation:    Gait:    Significant findings:  Ambulating with decreased WB through L LE with use of B crutches                                   Assessment/Plan:    Patient is a 26 year old male with left side knee complaints.    Patient has the following significant findings with corresponding treatment plan.                Diagnosis 1:  S/P L knee meniscus repair  Pain -  self management, education and home program  Decreased ROM/flexibility - manual therapy, therapeutic exercise, therapeutic activity and home program  Decreased strength - therapeutic exercise, therapeutic activities and home program  Impaired balance - neuro re-education, therapeutic activities and home program  Decreased proprioception - neuro re-education, therapeutic activities and home program  Inflammation - self management/home program  Impaired gait - gait training and home program  Impaired  muscle performance - neuro re-education and home program  Decreased function - therapeutic activities and home program    Re: Gonzalez Delgado   :   1995      Therapy Evaluation Codes:   1) History comprised of:   Personal factors that impact the plan of care:      None.    Comorbidity factors that impact the plan of care are:      None.     Medications impacting care: Anti-inflammatory.  2) Examination of Body Systems comprised of:   Body structures and functions that impact the plan of care:      Knee.   Activity limitations that impact the plan of care are:      Bending, Lifting, Squatting/kneeling, Stairs, Standing, Walking and Working.  3) Clinical presentation characteristics are:   Stable/Uncomplicated.  4) Decision-Making    Low complexity using standardized patient assessment instrument and/or measureable assessment of functional outcome.  Cumulative Therapy Evaluation is: Low complexity.    Previous and current functional limitations:  (See Goal Flow Sheet for this information)    Short term and Long term goals: (See Goal Flow Sheet for this information)     Communication ability:  Patient appears to be able to clearly communicate and understand verbal and written communication and follow directions correctly.  Treatment Explanation - The following has been discussed with the patient:   RX ordered/plan of care  Anticipated outcomes  Possible risks and side effects  This patient would benefit from PT intervention to resume normal activities.   Rehab potential is good.    Frequency:  1-2 X week, once daily  Duration:  for 6-8 weeks  Discharge Plan:  Achieve all LTG.  Independent in home treatment program.  Reach maximal therapeutic benefit.          Thank you for your referral.    INQUIRIES  Therapist: Krupa Garcia MPT [facility] [place]  [address]  [facility] [address]  Phone: [phone]

## 2021-12-02 NOTE — PROGRESS NOTES
Physical Therapy Initial Evaluation  Subjective:  The history is provided by the patient. No  was used.   Therapist Generated HPI Evaluation  Problem details: Onset of knee pain on 9/29/21 when she slipped on some coffee at work. Ultimately underwent knee scope on 10/29/21. Did a repair of a bucket handle tear of the meniscus. Since surgery, things are improving but still limited. Is off pain meds and is taking only IBP or Tylenol. Notes there are times he feels he could use a pain med but doesn't. Notes most of the pain is when he bends it. Walking with the crutches is OK but going down stairs is terrible. Is currently 50% WB, work restrictions. No specific bending restriction from the MD. .         Type of problem:  Left knee.    This is a new condition.  Condition occurred with:  A twist.  Where condition occurred: at work.  Patient reports pain:  Medial and anterior.  Pain is described as sharp and stabbing (hot) and is intermittent.  Pain is the same all the time.  Since onset symptoms are gradually improving.  Associated symptoms:  Loss of motion/stiffness, loss of strength, edema and buckling/giving out. Symptoms are exacerbated by walking, ascending stairs and descending stairs (bending, not really doing much right now)  and relieved by analgesics and NSAID's.  Special tests included:  X-ray and MRI (bucket handle tear).  Previous treatment includes surgery.   Restrictions due to condition include:  Currently not working due to present treatment.  Barriers include:  None as reported by patient.    Patient Health History         Pain is reported as 5/10 on pain scale.  General health as reported by patient is excellent.     Red flags:  Changes in bowel and bladder habits and pain at rest/night (knee pain at night and constipation from pain meds).  Medical allergies: none.   Surgeries include:  Orthopedic surgery.    Current medications:  Anti-inflammatory.    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   Primary job tasks include:  Prolonged sitting and driving.                                    Objective:  System                                                Knee Evaluation:  ROM:    AROM      Extension: Left: 0    Right:   Flexion: Left: 101   Right:        Strength:         Quad Set Left:  Poor    Pain: +/-   Quad Set Right: Good    Pain:      Palpation:  Normal      Edema:  Edema of the knee: slight joint effusion noted around knee.    Mobility Testing:      Patellofemoral Medial:  Left: hypomobile      Patellofemoral Lateral:  Left: hypomobile      Patellofemoral Superior:  Left: normal      Patellofemoral Inferior:  Left: normal                General Evaluation:                              Gait:    Significant findings:  Ambulating with decreased WB through L LE with use of B crutches                                     ROS    Assessment/Plan:    Patient is a 26 year old male with left side knee complaints.    Patient has the following significant findings with corresponding treatment plan.                Diagnosis 1:  S/P L knee meniscus repair  Pain -  self management, education and home program  Decreased ROM/flexibility - manual therapy, therapeutic exercise, therapeutic activity and home program  Decreased strength - therapeutic exercise, therapeutic activities and home program  Impaired balance - neuro re-education, therapeutic activities and home program  Decreased proprioception - neuro re-education, therapeutic activities and home program  Inflammation - self management/home program  Impaired gait - gait training and home program  Impaired muscle performance - neuro re-education and home program  Decreased function - therapeutic activities and home program    Therapy Evaluation Codes:   1) History comprised of:   Personal factors that impact the plan of care:      None.    Comorbidity factors that impact the plan of care are:      None.     Medications impacting care:  Anti-inflammatory.  2) Examination of Body Systems comprised of:   Body structures and functions that impact the plan of care:      Knee.   Activity limitations that impact the plan of care are:      Bending, Lifting, Squatting/kneeling, Stairs, Standing, Walking and Working.  3) Clinical presentation characteristics are:   Stable/Uncomplicated.  4) Decision-Making    Low complexity using standardized patient assessment instrument and/or measureable assessment of functional outcome.  Cumulative Therapy Evaluation is: Low complexity.    Previous and current functional limitations:  (See Goal Flow Sheet for this information)    Short term and Long term goals: (See Goal Flow Sheet for this information)     Communication ability:  Patient appears to be able to clearly communicate and understand verbal and written communication and follow directions correctly.  Treatment Explanation - The following has been discussed with the patient:   RX ordered/plan of care  Anticipated outcomes  Possible risks and side effects  This patient would benefit from PT intervention to resume normal activities.   Rehab potential is good.    Frequency:  1-2 X week, once daily  Duration:  for 6-8 weeks  Discharge Plan:  Achieve all LTG.  Independent in home treatment program.  Reach maximal therapeutic benefit.    Please refer to the daily flowsheet for treatment today, total treatment time and time spent performing 1:1 timed codes.

## 2021-12-09 ENCOUNTER — THERAPY VISIT (OUTPATIENT)
Dept: PHYSICAL THERAPY | Facility: CLINIC | Age: 26
End: 2021-12-09
Payer: OTHER MISCELLANEOUS

## 2021-12-09 DIAGNOSIS — G89.18 ACUTE POST-OPERATIVE PAIN: ICD-10-CM

## 2021-12-09 DIAGNOSIS — M25.562 ACUTE PAIN OF LEFT KNEE: ICD-10-CM

## 2021-12-09 PROCEDURE — 97112 NEUROMUSCULAR REEDUCATION: CPT | Mod: GP | Performed by: PHYSICAL THERAPIST

## 2021-12-09 PROCEDURE — 97110 THERAPEUTIC EXERCISES: CPT | Mod: GP | Performed by: PHYSICAL THERAPIST

## 2021-12-16 ENCOUNTER — THERAPY VISIT (OUTPATIENT)
Dept: PHYSICAL THERAPY | Facility: CLINIC | Age: 26
End: 2021-12-16
Payer: OTHER MISCELLANEOUS

## 2021-12-16 DIAGNOSIS — G89.18 ACUTE POST-OPERATIVE PAIN: ICD-10-CM

## 2021-12-16 DIAGNOSIS — M25.562 ACUTE PAIN OF LEFT KNEE: ICD-10-CM

## 2021-12-16 PROCEDURE — 97110 THERAPEUTIC EXERCISES: CPT | Mod: GP

## 2021-12-16 PROCEDURE — 97140 MANUAL THERAPY 1/> REGIONS: CPT | Mod: GP

## 2022-01-03 ENCOUNTER — THERAPY VISIT (OUTPATIENT)
Dept: PHYSICAL THERAPY | Facility: CLINIC | Age: 27
End: 2022-01-03
Payer: OTHER MISCELLANEOUS

## 2022-01-03 DIAGNOSIS — G89.18 ACUTE POST-OPERATIVE PAIN: ICD-10-CM

## 2022-01-03 DIAGNOSIS — M25.562 ACUTE PAIN OF LEFT KNEE: ICD-10-CM

## 2022-01-03 PROCEDURE — 97112 NEUROMUSCULAR REEDUCATION: CPT | Mod: GP | Performed by: PHYSICAL THERAPIST

## 2022-01-03 PROCEDURE — 97110 THERAPEUTIC EXERCISES: CPT | Mod: GP | Performed by: PHYSICAL THERAPIST

## 2022-01-24 ENCOUNTER — THERAPY VISIT (OUTPATIENT)
Dept: PHYSICAL THERAPY | Facility: CLINIC | Age: 27
End: 2022-01-24
Payer: OTHER MISCELLANEOUS

## 2022-01-24 DIAGNOSIS — G89.18 ACUTE POST-OPERATIVE PAIN: ICD-10-CM

## 2022-01-24 DIAGNOSIS — M25.562 ACUTE PAIN OF LEFT KNEE: ICD-10-CM

## 2022-01-24 PROCEDURE — 97112 NEUROMUSCULAR REEDUCATION: CPT | Mod: GP | Performed by: PHYSICAL THERAPIST

## 2022-01-24 PROCEDURE — 97530 THERAPEUTIC ACTIVITIES: CPT | Mod: GP | Performed by: PHYSICAL THERAPIST

## 2022-01-24 PROCEDURE — 97110 THERAPEUTIC EXERCISES: CPT | Mod: GP | Performed by: PHYSICAL THERAPIST

## 2022-01-24 ASSESSMENT — ACTIVITIES OF DAILY LIVING (ADL)
HOW_WOULD_YOU_RATE_THE_CURRENT_FUNCTION_OF_YOUR_KNEE_DURING_YOUR_USUAL_DAILY_ACTIVITIES_ON_A_SCALE_FROM_0_TO_100_WITH_100_BEING_YOUR_LEVEL_OF_KNEE_FUNCTION_PRIOR_TO_YOUR_INJURY_AND_0_BEING_THE_INABILITY_TO_PERFORM_ANY_OF_YOUR_USUAL_DAILY_ACTIVITIES?: 70
WEAKNESS: I HAVE THE SYMPTOM BUT IT DOES NOT AFFECT MY ACTIVITY
GO UP STAIRS: ACTIVITY IS SOMEWHAT DIFFICULT
SIT WITH YOUR KNEE BENT: ACTIVITY IS NOT DIFFICULT
SQUAT: ACTIVITY IS VERY DIFFICULT
GO DOWN STAIRS: ACTIVITY IS SOMEWHAT DIFFICULT
RAW_SCORE: 45
HOW_WOULD_YOU_RATE_THE_OVERALL_FUNCTION_OF_YOUR_KNEE_DURING_YOUR_USUAL_DAILY_ACTIVITIES?: ABNORMAL
SWELLING: I HAVE THE SYMPTOM BUT IT DOES NOT AFFECT MY ACTIVITY
STAND: ACTIVITY IS SOMEWHAT DIFFICULT
AS_A_RESULT_OF_YOUR_KNEE_INJURY,_HOW_WOULD_YOU_RATE_YOUR_CURRENT_LEVEL_OF_DAILY_ACTIVITY?: ABNORMAL
GIVING WAY, BUCKLING OR SHIFTING OF KNEE: I HAVE THE SYMPTOM BUT IT DOES NOT AFFECT MY ACTIVITY
KNEEL ON THE FRONT OF YOUR KNEE: ACTIVITY IS VERY DIFFICULT
PAIN: THE SYMPTOM AFFECTS MY ACTIVITY SLIGHTLY
STIFFNESS: THE SYMPTOM AFFECTS MY ACTIVITY MODERATELY
LIMPING: I HAVE THE SYMPTOM BUT IT DOES NOT AFFECT MY ACTIVITY
WALK: ACTIVITY IS SOMEWHAT DIFFICULT
KNEE_ACTIVITY_OF_DAILY_LIVING_SUM: 45
RISE FROM A CHAIR: ACTIVITY IS NOT DIFFICULT
KNEE_ACTIVITY_OF_DAILY_LIVING_SCORE: 64.29

## 2022-01-24 NOTE — PROGRESS NOTES
Subjective:  HPI  Physical Exam                    Objective:  System    Physical Exam    General     ROS    Assessment/Plan:    PROGRESS  REPORT    Progress reporting period is from 12/2/21 to 1/24/22.       SUBJECTIVE  Subjective changes noted by patient:  Patient returns after not being seen for a few weeks. Notes that he started back at work last week part time and notes that he is noting pain in the inferior patellar and medial knee area. Notes the pain randomly for short periods of time throughout the day but then notes it at night. Notes the pain is about a 5/10. Walking is going OK and can tell it's sore and noticeable but doesn't limp at all. At work, able to stand about 1.5 hours before he needs to sit down. Doing 4 hours for 2 weeks, 6 hours for 2 weeks and then 8 as tolerated.  He did see MD around the 17th and was told he needed to start moving it more. No squatting down, no kneeling.       Current pain level is 0/10 most of the time but benavidez up to 5/10 at times.     Previous pain level was  5/10  .   Changes in function:  Yes (See Goal flowsheet attached for changes in current functional level)  Adverse reaction to treatment or activity: None    OBJECTIVE  Changes noted in objective findings:  Yes, Flex: 116, Hyperext: 4. Fair SLS. Unable to appropriately weight shift. Leans to the L. Does have normal gait pattern. Poor closed chain quad control. Fatigues very quickly and has a hard time with controlling terminal knee extension.         ASSESSMENT/PLAN  Updated problem list and treatment plan: Diagnosis 1:  S/P meniscus repair  Pain -  self management, education and home program  Decreased ROM/flexibility - manual therapy, therapeutic exercise, therapeutic activity and home program  Decreased strength - therapeutic exercise, therapeutic activities and home program  Impaired balance - neuro re-education, therapeutic activities and home program  Decreased proprioception - neuro re-education,  therapeutic activities and home program  Impaired muscle performance - neuro re-education and home program  Decreased function - therapeutic activities and home program  STG/LTGs have been met or progress has been made towards goals:  Yes (See Goal flow sheet completed today.)  Assessment of Progress: The patient's condition is improving.  The patient's condition has potential to improve.  Self Management Plans:  Patient has been instructed in a home treatment program.  Patient  has been instructed in self management of symptoms.  I have re-evaluated this patient and find that the nature, scope, duration and intensity of the therapy is appropriate for the medical condition of the patient.  Gonzalez continues to require the following intervention to meet STG and LTG's:  PT    Recommendations:  This patient would benefit from continued therapy.     Frequency:  1 X week, once daily  Duration:  for 4-6 weeks        Please refer to the daily flowsheet for treatment today, total treatment time and time spent performing 1:1 timed codes.

## 2022-02-02 ENCOUNTER — THERAPY VISIT (OUTPATIENT)
Dept: PHYSICAL THERAPY | Facility: CLINIC | Age: 27
End: 2022-02-02
Payer: OTHER MISCELLANEOUS

## 2022-02-02 DIAGNOSIS — M25.562 ACUTE PAIN OF LEFT KNEE: ICD-10-CM

## 2022-02-02 DIAGNOSIS — G89.18 ACUTE POST-OPERATIVE PAIN: ICD-10-CM

## 2022-02-02 PROCEDURE — 97110 THERAPEUTIC EXERCISES: CPT | Mod: GP | Performed by: PHYSICAL THERAPIST

## 2022-02-02 PROCEDURE — 97530 THERAPEUTIC ACTIVITIES: CPT | Mod: GP | Performed by: PHYSICAL THERAPIST

## 2022-02-02 PROCEDURE — 97112 NEUROMUSCULAR REEDUCATION: CPT | Mod: GP | Performed by: PHYSICAL THERAPIST

## 2022-02-06 ENCOUNTER — MYC MEDICAL ADVICE (OUTPATIENT)
Dept: PHYSICAL THERAPY | Facility: CLINIC | Age: 27
End: 2022-02-06

## 2022-02-16 ENCOUNTER — THERAPY VISIT (OUTPATIENT)
Dept: PHYSICAL THERAPY | Facility: CLINIC | Age: 27
End: 2022-02-16
Payer: OTHER MISCELLANEOUS

## 2022-02-16 DIAGNOSIS — M25.562 ACUTE PAIN OF LEFT KNEE: ICD-10-CM

## 2022-02-16 DIAGNOSIS — G89.18 ACUTE POST-OPERATIVE PAIN: ICD-10-CM

## 2022-02-16 PROCEDURE — 97110 THERAPEUTIC EXERCISES: CPT | Mod: GP | Performed by: PHYSICAL THERAPIST

## 2022-02-16 PROCEDURE — 97112 NEUROMUSCULAR REEDUCATION: CPT | Mod: GP | Performed by: PHYSICAL THERAPIST

## 2022-02-16 PROCEDURE — 97530 THERAPEUTIC ACTIVITIES: CPT | Mod: GP | Performed by: PHYSICAL THERAPIST

## 2022-02-23 ENCOUNTER — THERAPY VISIT (OUTPATIENT)
Dept: PHYSICAL THERAPY | Facility: CLINIC | Age: 27
End: 2022-02-23
Payer: OTHER MISCELLANEOUS

## 2022-02-23 DIAGNOSIS — M25.562 ACUTE PAIN OF LEFT KNEE: ICD-10-CM

## 2022-02-23 DIAGNOSIS — G89.18 ACUTE POST-OPERATIVE PAIN: ICD-10-CM

## 2022-02-23 PROCEDURE — 97112 NEUROMUSCULAR REEDUCATION: CPT | Mod: GP | Performed by: PHYSICAL THERAPIST

## 2022-02-23 PROCEDURE — 97530 THERAPEUTIC ACTIVITIES: CPT | Mod: GP | Performed by: PHYSICAL THERAPIST

## 2022-02-23 PROCEDURE — 97110 THERAPEUTIC EXERCISES: CPT | Mod: GP | Performed by: PHYSICAL THERAPIST

## 2022-02-23 ASSESSMENT — ACTIVITIES OF DAILY LIVING (ADL)
STAND: ACTIVITY IS NOT DIFFICULT
KNEE_ACTIVITY_OF_DAILY_LIVING_SCORE: 95.71
PAIN: I DO NOT HAVE THE SYMPTOM
RISE FROM A CHAIR: ACTIVITY IS NOT DIFFICULT
RAW_SCORE: 67
STIFFNESS: I HAVE THE SYMPTOM BUT IT DOES NOT AFFECT MY ACTIVITY
KNEEL ON THE FRONT OF YOUR KNEE: ACTIVITY IS NOT DIFFICULT
AS_A_RESULT_OF_YOUR_KNEE_INJURY,_HOW_WOULD_YOU_RATE_YOUR_CURRENT_LEVEL_OF_DAILY_ACTIVITY?: NORMAL
KNEE_ACTIVITY_OF_DAILY_LIVING_SUM: 67
WALK: ACTIVITY IS NOT DIFFICULT
GIVING WAY, BUCKLING OR SHIFTING OF KNEE: I DO NOT HAVE THE SYMPTOM
SQUAT: ACTIVITY IS MINIMALLY DIFFICULT
SIT WITH YOUR KNEE BENT: ACTIVITY IS NOT DIFFICULT
WEAKNESS: I HAVE THE SYMPTOM BUT IT DOES NOT AFFECT MY ACTIVITY
HOW_WOULD_YOU_RATE_THE_CURRENT_FUNCTION_OF_YOUR_KNEE_DURING_YOUR_USUAL_DAILY_ACTIVITIES_ON_A_SCALE_FROM_0_TO_100_WITH_100_BEING_YOUR_LEVEL_OF_KNEE_FUNCTION_PRIOR_TO_YOUR_INJURY_AND_0_BEING_THE_INABILITY_TO_PERFORM_ANY_OF_YOUR_USUAL_DAILY_ACTIVITIES?: 93
LIMPING: I DO NOT HAVE THE SYMPTOM
HOW_WOULD_YOU_RATE_THE_OVERALL_FUNCTION_OF_YOUR_KNEE_DURING_YOUR_USUAL_DAILY_ACTIVITIES?: NORMAL
SWELLING: I DO NOT HAVE THE SYMPTOM
GO DOWN STAIRS: ACTIVITY IS NOT DIFFICULT
GO UP STAIRS: ACTIVITY IS NOT DIFFICULT

## 2022-02-23 NOTE — LETTER
[facility]  [id number] [facility] [address]  [phone]    2022    Re: Gonzalez Delgado   :   1995  MRN:  [id number]   REFERRING PHYSICIAN:   MD AKHIL [facility]    Date of Initial Evaluation:  2021  Visits:  Rxs Used: 8  Reason for Referral:     Acute pain of left knee  Acute post-operative pain    EVALUATION SUMMARY    DISCHARGE REPORT    Progress reporting period is from 22 to 22.       SUBJECTIVE   Subjective: Pt notes that knee continues to improve, still some difficulty with step downs. Feels 90% improved. F/u with MD tomorrow.      Current Pain level: 0/10.      Initial Pain level: 5/10.   Changes in function:  Yes (See Goal flowsheet attached for changes in current functional level)  Adverse reaction to treatment or activity: None    OBJECTIVE  Changes noted in objective findings:  Yes,   Objective: 4-0-125. Knee extension 5/5, knee flexion 5/5, hip abduction 5/5     ASSESSMENT/PLAN  Updated problem list and treatment plan: Diagnosis 1:  S/p L knee meniscus repair  Decreased strength - home program  STG/LTGs have been met or progress has been made towards goals:  Yes (See Goal flow sheet completed today.)  Assessment of Progress: The patient's condition is improving.  The patient has met all of their long term goals.  Self Management Plans:  Patient is independent in a home treatment program.  Patient is independent in self management of symptoms.  I have re-evaluated this patient and find that the nature, scope, duration and intensity of the therapy is appropriate for the medical condition of the patient.  Gonzalez continues to require the following intervention to meet STG and LTG's:  PT intervention is no longer required to meet STG/LTG.        Re: Gonzalez SHORE Danny ANAYA:   1995        Recommendations:  This patient is ready to be discharged from therapy and continue  their home treatment program.        Thank you for your referral.    INQUIRIES  Therapist: Daisy Valencia PT  [facility]  [address]  [facility] [address]  Phone: [phone]

## 2022-02-23 NOTE — PROGRESS NOTES
DISCHARGE REPORT    Progress reporting period is from 12/2/22 to 2/23/22.       SUBJECTIVE   Subjective: Pt notes that knee continues to improve, still some difficulty with step downs. Feels 90% improved. F/u with MD tomorrow.      Current Pain level: 0/10.      Initial Pain level: 5/10.   Changes in function:  Yes (See Goal flowsheet attached for changes in current functional level)  Adverse reaction to treatment or activity: None    OBJECTIVE  Changes noted in objective findings:  Yes,   Objective: 4-0-125. Knee extension 5/5, knee flexion 5/5, hip abduction 5/5     ASSESSMENT/PLAN  Updated problem list and treatment plan: Diagnosis 1:  S/p L knee meniscus repair  Decreased strength - home program  STG/LTGs have been met or progress has been made towards goals:  Yes (See Goal flow sheet completed today.)  Assessment of Progress: The patient's condition is improving.  The patient has met all of their long term goals.  Self Management Plans:  Patient is independent in a home treatment program.  Patient is independent in self management of symptoms.  I have re-evaluated this patient and find that the nature, scope, duration and intensity of the therapy is appropriate for the medical condition of the patient.  Gonzalez continues to require the following intervention to meet STG and LTG's:  PT intervention is no longer required to meet STG/LTG.    Recommendations:  This patient is ready to be discharged from therapy and continue their home treatment program.    Please refer to the daily flowsheet for treatment today, total treatment time and time spent performing 1:1 timed codes.

## 2022-05-21 ENCOUNTER — HEALTH MAINTENANCE LETTER (OUTPATIENT)
Age: 27
End: 2022-05-21

## 2022-09-18 ENCOUNTER — HEALTH MAINTENANCE LETTER (OUTPATIENT)
Age: 27
End: 2022-09-18
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
## 2023-01-16 ENCOUNTER — APPOINTMENT (OUTPATIENT)
Dept: FAMILY MEDICINE | Facility: CLINIC | Age: 28
End: 2023-01-16
Payer: COMMERCIAL

## 2023-06-04 ENCOUNTER — HEALTH MAINTENANCE LETTER (OUTPATIENT)
Age: 28
End: 2023-06-04

## 2024-07-14 ENCOUNTER — HEALTH MAINTENANCE LETTER (OUTPATIENT)
Age: 29
End: 2024-07-14

## 2025-07-19 ENCOUNTER — HEALTH MAINTENANCE LETTER (OUTPATIENT)
Age: 30
End: 2025-07-19